# Patient Record
Sex: FEMALE | Race: WHITE | NOT HISPANIC OR LATINO | Employment: FULL TIME | URBAN - METROPOLITAN AREA
[De-identification: names, ages, dates, MRNs, and addresses within clinical notes are randomized per-mention and may not be internally consistent; named-entity substitution may affect disease eponyms.]

---

## 2021-05-13 ENCOUNTER — APPOINTMENT (OUTPATIENT)
Dept: LAB | Facility: CLINIC | Age: 31
End: 2021-05-13

## 2021-05-13 ENCOUNTER — APPOINTMENT (OUTPATIENT)
Dept: URGENT CARE | Facility: CLINIC | Age: 31
End: 2021-05-13

## 2021-05-13 DIAGNOSIS — Z02.1 PRE-EMPLOYMENT EXAMINATION: ICD-10-CM

## 2021-05-13 DIAGNOSIS — Z02.1 PRE-EMPLOYMENT EXAMINATION: Primary | ICD-10-CM

## 2021-05-13 PROCEDURE — 36415 COLL VENOUS BLD VENIPUNCTURE: CPT

## 2021-05-13 PROCEDURE — 86480 TB TEST CELL IMMUN MEASURE: CPT

## 2021-05-14 LAB
GAMMA INTERFERON BACKGROUND BLD IA-ACNC: 0.03 IU/ML
M TB IFN-G BLD-IMP: NEGATIVE
M TB IFN-G CD4+ BCKGRND COR BLD-ACNC: -0.01 IU/ML
M TB IFN-G CD4+ BCKGRND COR BLD-ACNC: 0 IU/ML
MITOGEN IGNF BCKGRD COR BLD-ACNC: >10 IU/ML

## 2021-09-08 ENCOUNTER — APPOINTMENT (OUTPATIENT)
Dept: LAB | Facility: HOSPITAL | Age: 31
End: 2021-09-08

## 2021-09-08 DIAGNOSIS — Z00.8 HEALTH EXAMINATION IN POPULATION SURVEY: ICD-10-CM

## 2021-09-08 LAB
CHOLEST SERPL-MCNC: 195 MG/DL (ref 50–200)
EST. AVERAGE GLUCOSE BLD GHB EST-MCNC: 97 MG/DL
HBA1C MFR BLD: 5 %
HDLC SERPL-MCNC: 74 MG/DL
LDLC SERPL CALC-MCNC: 108 MG/DL (ref 0–100)
NONHDLC SERPL-MCNC: 121 MG/DL
TRIGL SERPL-MCNC: 64 MG/DL

## 2021-09-08 PROCEDURE — 36415 COLL VENOUS BLD VENIPUNCTURE: CPT

## 2021-09-08 PROCEDURE — 83036 HEMOGLOBIN GLYCOSYLATED A1C: CPT

## 2021-09-08 PROCEDURE — 80061 LIPID PANEL: CPT

## 2021-09-13 ENCOUNTER — OFFICE VISIT (OUTPATIENT)
Dept: FAMILY MEDICINE CLINIC | Facility: CLINIC | Age: 31
End: 2021-09-13
Payer: COMMERCIAL

## 2021-09-13 VITALS
HEIGHT: 62 IN | BODY MASS INDEX: 24.88 KG/M2 | WEIGHT: 135.2 LBS | HEART RATE: 61 BPM | DIASTOLIC BLOOD PRESSURE: 68 MMHG | OXYGEN SATURATION: 99 % | TEMPERATURE: 99 F | SYSTOLIC BLOOD PRESSURE: 118 MMHG | RESPIRATION RATE: 18 BRPM

## 2021-09-13 DIAGNOSIS — N93.9 ABNORMAL UTERINE BLEEDING: ICD-10-CM

## 2021-09-13 DIAGNOSIS — Z00.00 ANNUAL PHYSICAL EXAM: Primary | ICD-10-CM

## 2021-09-13 DIAGNOSIS — Z12.4 SCREENING FOR CERVICAL CANCER: ICD-10-CM

## 2021-09-13 DIAGNOSIS — Z86.39 HISTORY OF IRON DEFICIENCY: ICD-10-CM

## 2021-09-13 PROCEDURE — 3008F BODY MASS INDEX DOCD: CPT | Performed by: INTERNAL MEDICINE

## 2021-09-13 PROCEDURE — 99213 OFFICE O/P EST LOW 20 MIN: CPT | Performed by: INTERNAL MEDICINE

## 2021-09-13 PROCEDURE — 3725F SCREEN DEPRESSION PERFORMED: CPT | Performed by: INTERNAL MEDICINE

## 2021-09-13 PROCEDURE — 99385 PREV VISIT NEW AGE 18-39: CPT | Performed by: INTERNAL MEDICINE

## 2021-09-13 PROCEDURE — 1036F TOBACCO NON-USER: CPT | Performed by: INTERNAL MEDICINE

## 2021-09-13 RX ORDER — FEXOFENADINE HCL 180 MG/1
180 TABLET ORAL AS NEEDED
COMMUNITY

## 2021-09-13 RX ORDER — COVID-19 ANTIGEN TEST
KIT MISCELLANEOUS AS NEEDED
COMMUNITY

## 2021-09-13 RX ORDER — VITAMIN B COMPLEX
1 CAPSULE ORAL DAILY
COMMUNITY

## 2021-09-13 NOTE — PROGRESS NOTES
[facility]    NAME: Stephenie Clancy  AGE: 36 y o  SEX: female  : 1990     DATE: 2021     Assessment and Plan:     Problem List Items Addressed This Visit        Genitourinary    Abnormal uterine bleeding    Relevant Orders    TSH, 3rd generation with Free T4 reflex       Other    History of iron deficiency      Other Visit Diagnoses     Annual physical exam    -  Primary    Screening for cervical cancer              Immunizations and preventive care screenings were discussed with patient today  Appropriate education was printed on patient's after visit summary  Counseling:  Alcohol/drug use: discussed moderation in alcohol intake, the recommendations for healthy alcohol use, and avoidance of illicit drug use  Dental Health: discussed importance of regular tooth brushing, flossing, and dental visits  Injury prevention: discussed safety/seat belts, safety helmets, smoke detectors, carbon dioxide detectors, and smoking near bedding or upholstery  Sexual health: discussed sexually transmitted diseases, partner selection, use of condoms, avoidance of unintended pregnancy, and contraceptive alternatives  · Exercise: the importance of regular exercise/physical activity was discussed  Recommend exercise 3-5 times per week for at least 30 minutes  Return in about 6 months (around 3/13/2022) for Follow up/Pap   Chief Complaint:     Chief Complaint   Patient presents with   [facility] Annual Exam     caring starts with you phys      History of Present Illness:     Adult Annual Physical   Patient here for a comprehensive physical exam  The patient reports problems - needs physical for work  Diet and Physical Activity  · Diet/Nutrition: well balanced diet  Limits processed foods, high fiber  · Exercise: walking, strength training exercises and 3-4 times a week on average        Depression Screening  PHQ-9 Depression Screening    PHQ-9:   Frequency of the following problems over the past two weeks:      Little interest or pleasure in doing things: 0 - not at all  Feeling down, depressed, or hopeless: 0 - not at all  PHQ-2 Score: 0       General Health  · Sleep: sleeps well and gets 7-8 hours of sleep on average  · Hearing: normal - bilateral   · Vision: most recent eye exam <1 year ago and wears glasses  · Dental: regular dental visits  /GYN Health  · Last menstrual period: currently menstruating  · Contraceptive method: Na   · History of STDs?: no      Review of Systems:     Review of Systems  Refer to separate note     Past Medical History:     History reviewed  No pertinent past medical history  Past Surgical History:     Past Surgical History:   Procedure Laterality Date    MOLE REMOVAL Right 2013    right dorasal hand - benign    WISDOM TOOTH EXTRACTION Bilateral       Social History:     Social History     Socioeconomic History    Marital status: Single     Spouse name: None    Number of children: None    Years of education: None    Highest education level: None   Occupational History    None   Tobacco Use    Smoking status: Never Smoker    Smokeless tobacco: Never Used   Vaping Use    Vaping Use: Never used   Substance and Sexual Activity    Alcohol use: Yes     Comment: "very seldom"    Drug use: Never    Sexual activity: Not Currently     Partners: Male   Other Topics Concern    None   Social History Narrative    None     Social Determinants of Health     Financial Resource Strain:     Difficulty of Paying Living Expenses:    Food Insecurity:     Worried About Running Out of Food in the Last Year:     Ran Out of Food in the Last Year:    Transportation Needs:     Lack of Transportation (Medical):      Lack of Transportation (Non-Medical):    Physical Activity:     Days of Exercise per Week:     Minutes of Exercise per Session:    Stress:     Feeling of Stress :    Social Connections:     Frequency of Communication with Friends and Family:     Frequency of Social Gatherings with Friends and Family:     Attends Synagogue Services:     Active Member of Clubs or Organizations:     Attends Club or Organization Meetings:     Marital Status:    Intimate Partner Violence:     Fear of Current or Ex-Partner:     Emotionally Abused:     Physically Abused:     Sexually Abused:       Family History:     Family History   Problem Relation Age of Onset    No Known Problems Mother     Mitral valve prolapse Father     No Known Problems Sister       Current Medications:     Current Outpatient Medications   Medication Sig Dispense Refill    b complex vitamins capsule Take 1 capsule by mouth daily      fexofenadine (ALLEGRA) 180 MG tablet Take 180 mg by mouth as needed      Naproxen Sodium (Aleve) 220 MG CAPS Take by mouth as needed       No current facility-administered medications for this visit  Allergies:      Allergies   Allergen Reactions    Cefaclor Hives    Cefprozil Hives    Cephalosporins Hives     Other reaction(s): hives      Physical Exam:     /68   Pulse 61   Temp 99 °F (37 2 °C)   Resp 18   Ht 5' 2" (1 575 m)   Wt 61 3 kg (135 lb 3 2 oz)   SpO2 99%   BMI 24 73 kg/m²     Physical Exam   Refer to separate note    Billy Oconnell MD   [address]

## 2021-09-13 NOTE — PROGRESS NOTES
[facility]        NAME: Iram Sandoval is a 36 y o  female  : 1990    MRN: [id number]  DATE: 2021  TIME: 3:31 PM    Assessment and Plan   1  Abnormal uterine bleeding  -history of menorrhagia and dysmenorrhea, largely resolved  Doesn't think she's had TFTs done      -   TSH, 3rd generation with Free T4 reflex; Future    2  Screening for cervical cancer  - offered to schedule pap smear at follow up in 6 months or referral to Gyn      3  Annual physical exam  - refer to annual PE note    4  History of iron deficiency  - due to menorrhagia, pt reports CBC within past year was normal             Chief Complaint     Chief Complaint   Patient presents with   [facility] [place] [facility]     caring starts with you phys         History of Present Illness       Here to establish care  Previous PCP in [facility]  Moved back home to [place] recently  No significant past medical history  AUB: reports history of heavy menses (saturating pad/tampon q2hrs, large clots) and painful menstrual cramps  This has largely resolved since making dietary changes - eating healthier, avoiding processed foods  There is history of iron deficiency but CBC within past year was normal according to pt  She denies excessive fatigue, lightheadedness or SOB  FHx: notable for MVP in father  Social hx: never smoker, drinks alcohol occasionally, no illicit drug use  Not currently sexually active  Health maintenance: last pap smear 2020, no history of abnormal pap or HPV  Review of Systems   Review of Systems   Constitutional: Negative for chills, fatigue, fever and unexpected weight change  HENT: Positive for postnasal drip and rhinorrhea  Negative for congestion, sore throat and trouble swallowing  Eyes: Negative for pain, redness, itching and visual disturbance  Respiratory: Negative for cough, chest tightness, shortness of breath and wheezing      Cardiovascular: Negative for chest pain, palpitations and leg swelling  Gastrointestinal: Negative for abdominal pain, blood in stool, constipation, diarrhea, nausea and vomiting  Endocrine: Negative for cold intolerance, heat intolerance, polydipsia and polyuria  Genitourinary: Negative for difficulty urinating, dysuria, frequency, hematuria, urgency and vaginal discharge  Musculoskeletal: Negative for arthralgias, back pain and joint swelling  Skin: Negative for rash  Neurological: Negative for dizziness, tremors, weakness, light-headedness, numbness and headaches  Psychiatric/Behavioral: Negative for confusion, dysphoric mood, hallucinations, sleep disturbance and suicidal ideas  The patient is not nervous/anxious  Current Medications       Current Outpatient Medications:     b complex vitamins capsule, Take 1 capsule by mouth daily, Disp: , Rfl:     fexofenadine (ALLEGRA) 180 MG tablet, Take 180 mg by mouth as needed, Disp: , Rfl:     Naproxen Sodium (Aleve) 220 MG CAPS, Take by mouth as needed, Disp: , Rfl:     Current Allergies     Allergies as of 09/13/2021 - Reviewed 09/13/2021   Allergen Reaction Noted    Cefaclor Hives 07/17/2015    Cefprozil Hives 07/17/2015    Cephalosporins Hives 07/17/2015            The following portions of the patient's history were reviewed and updated as appropriate: allergies, current medications, past family history, past medical history, past social history, past surgical history and problem list      History reviewed  No pertinent past medical history  Past Surgical History:   Procedure Laterality Date    MOLE REMOVAL Right 2013    right dorasal hand - benign    WISDOM TOOTH EXTRACTION Bilateral        Family History   Problem Relation Age of Onset    No Known Problems Mother     Mitral valve prolapse Father     No Known Problems Sister          Medications have been verified          Objective   /68   Pulse 61   Temp 99 °F (37 2 °C)   Resp 18   Ht 5' 2" (1 575 m)   Wt 61 3 kg (135 lb 3 2 oz)   SpO2 99%   BMI 24 73 kg/m²        Physical Exam     Physical Exam  Constitutional:       General: She is not in acute distress  Appearance: She is well-developed and normal weight  HENT:      Head: Normocephalic and atraumatic  Right Ear: Tympanic membrane, ear canal and external ear normal       Left Ear: Tympanic membrane, ear canal and external ear normal       Mouth/Throat:      Pharynx: No oropharyngeal exudate or posterior oropharyngeal erythema  Eyes:      General: No scleral icterus  Conjunctiva/sclera: Conjunctivae normal    Neck:      Thyroid: No thyromegaly  Vascular: No JVD  Cardiovascular:      Rate and Rhythm: Normal rate and regular rhythm  Pulses: Normal pulses  Heart sounds: Normal heart sounds  No murmur heard  No friction rub  No gallop  Pulmonary:      Effort: Pulmonary effort is normal  No respiratory distress  Breath sounds: Normal breath sounds  No wheezing, rhonchi or rales  Chest:      Chest wall: No tenderness  Abdominal:      General: Abdomen is flat  Bowel sounds are normal  There is no distension  Palpations: Abdomen is soft  There is no mass  Tenderness: There is no abdominal tenderness  There is no guarding or rebound  Hernia: No hernia is present  Musculoskeletal:         General: No deformity  Normal range of motion  Cervical back: Normal range of motion and neck supple  Right lower leg: No edema  Left lower leg: No edema  Lymphadenopathy:      Cervical: No cervical adenopathy  Skin:     General: Skin is warm and dry  Capillary Refill: Capillary refill takes less than 2 seconds  Neurological:      General: No focal deficit present  Mental Status: She is alert and oriented to person, place, and time     Psychiatric:         Mood and Affect: Mood and affect normal          Speech: Speech normal          Behavior: Behavior normal  Behavior is cooperative  Thought Content:  Thought content normal          Judgment: Judgment normal              Results:  No results found for: SODIUM, K, CL, CO2, BUN, CREATININE, GLUC, CALCIUM    Lab Results   Component Value Date    HGBA1C 5 0 09/08/2021     Lab Results   Component Value Date    CHOLESTEROL 195 09/08/2021     Lab Results   Component Value Date    HDL 74 09/08/2021     Lab Results   Component Value Date    TRIG 64 09/08/2021     Lab Results   Component Value Date    [facility] 121 09/08/2021

## 2021-09-13 NOTE — PATIENT INSTRUCTIONS

## 2021-09-21 ENCOUNTER — APPOINTMENT (OUTPATIENT)
Dept: LAB | Facility: CLINIC | Age: 31
End: 2021-09-21
Payer: COMMERCIAL

## 2021-09-21 DIAGNOSIS — N93.9 ABNORMAL UTERINE BLEEDING: ICD-10-CM

## 2021-09-21 LAB — TSH SERPL DL<=0.05 MIU/L-ACNC: 1.17 UIU/ML (ref 0.36–3.74)

## 2021-09-21 PROCEDURE — 36415 COLL VENOUS BLD VENIPUNCTURE: CPT

## 2021-09-21 PROCEDURE — 84443 ASSAY THYROID STIM HORMONE: CPT

## 2022-01-27 ENCOUNTER — OFFICE VISIT (OUTPATIENT)
Dept: URGENT CARE | Facility: CLINIC | Age: 32
End: 2022-01-27
Payer: COMMERCIAL

## 2022-01-27 VITALS
HEIGHT: 62 IN | WEIGHT: 130 LBS | TEMPERATURE: 98.5 F | OXYGEN SATURATION: 100 % | RESPIRATION RATE: 18 BRPM | BODY MASS INDEX: 23.92 KG/M2 | HEART RATE: 104 BPM | SYSTOLIC BLOOD PRESSURE: 112 MMHG | DIASTOLIC BLOOD PRESSURE: 70 MMHG

## 2022-01-27 DIAGNOSIS — R21 RASH: Primary | ICD-10-CM

## 2022-01-27 PROCEDURE — 99213 OFFICE O/P EST LOW 20 MIN: CPT

## 2022-01-27 RX ORDER — DOXYCYCLINE 100 MG/1
100 TABLET ORAL 2 TIMES DAILY
Qty: 28 TABLET | Refills: 0 | Status: SHIPPED | OUTPATIENT
Start: 2022-01-27 | End: 2022-02-10

## 2022-01-27 NOTE — PROGRESS NOTES
[facility]        NAME: Jossy Benavidez is a 36 y o  female  : 1990    MRN: [id number]  DATE: 2022  TIME: 8:28 AM    Assessment and Plan   Rash [R21]  1  Rash  doxycycline (ADOXA) 100 MG tablet       Patient concerned for Lyme  Low suspicion, however, she would still like course of Doxy  Also suggested to the patient to f/u with PCP for blood work if no treatment initiated and to monitor for new or spreading rash and for any new symptoms  Can apply vaseline or hydrocortisone cream to rash  Patient Instructions     Patient Instructions     Lyme Disease   WHAT YOU NEED TO KNOW:   Lyme disease is a bacterial infection caused by the bite of an infected tick  DISCHARGE INSTRUCTIONS:   Call your local emergency number (911 in the [address] [place] [address],[address]) if:   · Your heart is beating faster than usual and you feel dizzy  · You have chest pain or trouble breathing  · You suddenly cannot talk or see well, or you have trouble moving an area of your body  Return to the emergency department if:   · You have a headache and a stiff neck  · You have trouble concentrating or thinking clearly  · You have numbness or tingling in your arms or legs, or you have trouble walking  Call your doctor if:   · Your rash grows or spreads to other areas of your body  · You suddenly have trouble falling or staying asleep  · You have new or worsening pain and swelling in your joints  · You have new or worsening weakness and muscle pain  · You have a new tick bite  · You have questions or concerns about your condition or care  Medicines: You may need any of the following:  · Antibiotics  treat a bacterial infection  · Take your medicine as directed  Contact your healthcare provider if you think your medicine is not helping or if you have side effects  Tell him of her if you are allergic to any medicine  Keep a list of the medicines, vitamins, and herbs you take   Include the amounts, and when and why you take them  Bring the list or the pill bottles to follow-up visits  Carry your medicine list with you in case of an emergency  Prevent a tick bite:  Ticks live in areas covered by brush and grass  They may even be found in your lawn if you live in certain areas  Outdoor pets can carry ticks inside the house  Ticks can grab onto you or your clothes when you walk by grass or brush  If you go into areas that contain many trees, tall grasses, and underbrush, do the following:     · Wear light colored pants and a long-sleeved shirt  Tuck your pants into your socks or boots  Tuck in your shirt  Wear sleeves that fit close to the skin at your wrists and neck  This will help prevent ticks from crawling through gaps in your clothing and onto your skin  Wear a hat in areas with trees  · Apply insect repellant on your skin  The insect repellant should contain DEET  Do not put insect repellant on skin that is cut, scratched, or irritated  Always use soap and water to wash the insect repellant off as soon as possible once you are indoors  Do not apply insect repellant on your child's face or hands  · Spray insect repellant onto your clothes  Use permethrin spray  This spray kills ticks that crawl on your clothing  Be sure to spray the tops of your boots, bottom of pant legs, and sleeve cuffs  As soon as possible, wash and dry clothing in hot water and high heat  · Check your and your child's clothing, hair, and skin for ticks  Shower within 2 hours of coming indoors  Carefully check the hairline, armpits, neck, and waist     · Decrease the risk for ticks in your yard  Ticks like to live in shady, moist areas  [place] your lawn regularly to keep the grass short  Trim the grass around birdbaths and fences  Cut branches that are overgrown and take them out of the yard  Clear out leaf piles  Teola Bitter firewood in a dry, oral area  · Treat pets with tick control products  as directed   This will decrease your risk for a tick bite  Check your pets for ticks  Remove ticks from pets the same way as you remove them from people  Ask your pet's  about the best product to use on your pet  · Remove a tick with tweezers  Wear gloves  Grasp the tick as close to your skin as possible  Pull the tick straight up and out  Do not touch the tick with your bare hands  Check to make sure you removed the whole tick, including the head  Clean the area with soap and water or rubbing alcohol  Then wash your hands with soap and water  Follow up with your doctor as directed:  Write down your questions so you remember to ask them during your visits  © Copyright [employer] 2021 Information is for End User's use only and may not be sold, redistributed or otherwise used for commercial purposes  All illustrations and images included in CareNotes® are the copyrighted property of A [employer] M [employer] Inc  or [employer]   The above information is an  only  It is not intended as medical advice for individual conditions or treatments  Talk to your doctor, nurse or pharmacist before following any medical regimen to see if it is safe and effective for you  Follow up with PCP in 3-5 days  Proceed to  ER if symptoms worsen  Chief Complaint     Chief Complaint   Patient presents with    Rash     Patient noticed rash/lesion to left upper thigh this morning  History of Present Illness       HPI   Pritesh Kimbrough is a 36 y o  female who presents today with a rash on the left upper inner thigh that she noticed this morning  The rash is circular, light pink in color with a dry area in the center  Denies itching, burning, or pain  Denies history of eczema or lyme  Denies new soaps, detergents or lotion  Did not apply anything to the rash so far  Patient reports she sleeps with her dog and is concerned for Lyme  No known recent tick bite or ticks seen on her dog       Review of Systems   Review of Systems   Constitutional: Negative for chills, fatigue and fever  Eyes: Negative for pain and visual disturbance  Respiratory: Negative for cough and shortness of breath  Cardiovascular: Negative for chest pain and palpitations  Gastrointestinal: Negative for abdominal pain, diarrhea, nausea and vomiting  Musculoskeletal: Negative for arthralgias, back pain, joint swelling and myalgias  Skin: Positive for rash  Negative for color change  Neurological: Negative for dizziness and headaches  All other systems reviewed and are negative  Current Medications       Current Outpatient Medications:     b complex vitamins capsule, Take 1 capsule by mouth daily (Patient not taking: Reported on 1/27/2022 ), Disp: , Rfl:     doxycycline (ADOXA) 100 MG tablet, Take 1 tablet (100 mg total) by mouth 2 (two) times a day for 14 days, Disp: 28 tablet, Rfl: 0    fexofenadine (ALLEGRA) 180 MG tablet, Take 180 mg by mouth as needed (Patient not taking: Reported on 1/27/2022 ), Disp: , Rfl:     Naproxen Sodium (Aleve) 220 MG CAPS, Take by mouth as needed (Patient not taking: Reported on 1/27/2022 ), Disp: , Rfl:     Current Allergies     Allergies as of 01/27/2022 - Reviewed 01/27/2022   Allergen Reaction Noted    Cefaclor Hives 07/17/2015    Cefprozil Hives 07/17/2015    Cephalosporins Hives 07/17/2015            The following portions of the patient's history were reviewed and updated as appropriate: allergies, current medications, past family history, past medical history, past social history, past surgical history and problem list      History reviewed  No pertinent past medical history      Past Surgical History:   Procedure Laterality Date    MOLE REMOVAL Right 2013    right dorasal hand - benign    WISDOM TOOTH EXTRACTION Bilateral        Family History   Problem Relation Age of Onset    No Known Problems Mother     Mitral valve prolapse Father     No Known Problems Sister          Medications have been verified  Objective   /65   Pulse 104   Temp 98 7 °F (37 1 °C) (Temporal)   Resp 18   Ht 5' 2" (1 575 m)   Wt 59 kg (130 lb)   SpO2 100%   BMI 23 78 kg/m²        Physical Exam     Physical Exam  Vitals and nursing note reviewed  Constitutional:       General: She is not in acute distress  Appearance: Normal appearance  HENT:      Head: Normocephalic and atraumatic  Cardiovascular:      Rate and Rhythm: Normal rate and regular rhythm  Heart sounds: Normal heart sounds  Pulmonary:      Effort: Pulmonary effort is normal       Breath sounds: Normal breath sounds  No wheezing, rhonchi or rales  Lymphadenopathy:      Cervical: No cervical adenopathy  Skin:     General: Skin is warm and dry  Findings: Rash present  Psychiatric:         Behavior: Behavior normal  Behavior is cooperative

## 2022-01-27 NOTE — PATIENT INSTRUCTIONS
Lyme Disease   WHAT YOU NEED TO KNOW:   Lyme disease is a bacterial infection caused by the bite of an infected tick  DISCHARGE INSTRUCTIONS:   Call your local emergency number (911 in the [address] [facility] [address]) if:   · Your heart is beating faster than usual and you feel dizzy  · You have chest pain or trouble breathing  · You suddenly cannot talk or see well, or you have trouble moving an area of your body  Return to the emergency department if:   · You have a headache and a stiff neck  · You have trouble concentrating or thinking clearly  · You have numbness or tingling in your arms or legs, or you have trouble walking  Call your doctor if:   · Your rash grows or spreads to other areas of your body  · You suddenly have trouble falling or staying asleep  · You have new or worsening pain and swelling in your joints  · You have new or worsening weakness and muscle pain  · You have a new tick bite  · You have questions or concerns about your condition or care  Medicines: You may need any of the following:  · Antibiotics  treat a bacterial infection  · Take your medicine as directed  Contact your healthcare provider if you think your medicine is not helping or if you have side effects  Tell him of her if you are allergic to any medicine  Keep a list of the medicines, vitamins, and herbs you take  Include the amounts, and when and why you take them  Bring the list or the pill bottles to follow-up visits  Carry your medicine list with you in case of an emergency  Prevent a tick bite:  Ticks live in areas covered by brush and grass  They may even be found in your lawn if you live in certain areas  Outdoor pets can carry ticks inside the house  Ticks can grab onto you or your clothes when you walk by grass or brush  If you go into areas that contain many trees, tall grasses, and underbrush, do the following:     · Wear light colored pants and a long-sleeved shirt    Tuck your pants into your socks or boots  Tuck in your shirt  Wear sleeves that fit close to the skin at your wrists and neck  This will help prevent ticks from crawling through gaps in your clothing and onto your skin  Wear a hat in areas with trees  · Apply insect repellant on your skin  The insect repellant should contain DEET  Do not put insect repellant on skin that is cut, scratched, or irritated  Always use soap and water to wash the insect repellant off as soon as possible once you are indoors  Do not apply insect repellant on your child's face or hands  · Spray insect repellant onto your clothes  Use permethrin spray  This spray kills ticks that crawl on your clothing  Be sure to spray the tops of your boots, bottom of pant legs, and sleeve cuffs  As soon as possible, wash and dry clothing in hot water and high heat  · Check your and your child's clothing, hair, and skin for ticks  Shower within 2 hours of coming indoors  Carefully check the hairline, armpits, neck, and waist     · Decrease the risk for ticks in your yard  Ticks like to live in shady, moist areas  Stefanie Bosworth your lawn regularly to keep the grass short  Trim the grass around birdbaths and fences  Cut branches that are overgrown and take them out of the yard  Clear out leaf piles  Gradie Mems firewood in a dry, oral area  · Treat pets with tick control products  as directed  This will decrease your risk for a tick bite  Check your pets for ticks  Remove ticks from pets the same way as you remove them from people  Ask your pet's  about the best product to use on your pet  · Remove a tick with tweezers  Wear gloves  Grasp the tick as close to your skin as possible  Pull the tick straight up and out  Do not touch the tick with your bare hands  Check to make sure you removed the whole tick, including the head  Clean the area with soap and water or rubbing alcohol  Then wash your hands with soap and water         Follow up with your doctor as directed: Write down your questions so you remember to ask them during your visits  © Copyright [employer] 2021 Information is for End User's use only and may not be sold, redistributed or otherwise used for commercial purposes  All illustrations and images included in CareNotes® are the copyrighted property of A D A M , Inc  or Gracie Gaspar  The above information is an  only  It is not intended as medical advice for individual conditions or treatments  Talk to your doctor, nurse or pharmacist before following any medical regimen to see if it is safe and effective for you  good balance

## 2022-02-01 ENCOUNTER — LAB REQUISITION (OUTPATIENT)
Dept: LAB | Facility: HOSPITAL | Age: 32
End: 2022-02-01

## 2022-02-01 DIAGNOSIS — Z11.52 ENCOUNTER FOR SCREENING FOR COVID-19: ICD-10-CM

## 2022-02-01 PROCEDURE — U0005 INFEC AGEN DETEC AMPLI PROBE: HCPCS

## 2022-02-01 PROCEDURE — U0003 INFECTIOUS AGENT DETECTION BY NUCLEIC ACID (DNA OR RNA); SEVERE ACUTE RESPIRATORY SYNDROME CORONAVIRUS 2 (SARS-COV-2) (CORONAVIRUS DISEASE [COVID-19]), AMPLIFIED PROBE TECHNIQUE, MAKING USE OF HIGH THROUGHPUT TECHNOLOGIES AS DESCRIBED BY CMS-2020-01-R: HCPCS

## 2022-02-02 LAB — SARS-COV-2 RNA RESP QL NAA+PROBE: NEGATIVE

## 2022-02-15 ENCOUNTER — LAB REQUISITION (OUTPATIENT)
Dept: LAB | Facility: HOSPITAL | Age: 32
End: 2022-02-15

## 2022-02-15 DIAGNOSIS — Z11.52 ENCOUNTER FOR SCREENING FOR COVID-19: ICD-10-CM

## 2022-02-15 LAB — SARS-COV-2 RNA RESP QL NAA+PROBE: NEGATIVE

## 2022-02-15 PROCEDURE — U0003 INFECTIOUS AGENT DETECTION BY NUCLEIC ACID (DNA OR RNA); SEVERE ACUTE RESPIRATORY SYNDROME CORONAVIRUS 2 (SARS-COV-2) (CORONAVIRUS DISEASE [COVID-19]), AMPLIFIED PROBE TECHNIQUE, MAKING USE OF HIGH THROUGHPUT TECHNOLOGIES AS DESCRIBED BY CMS-2020-01-R: HCPCS

## 2022-02-15 PROCEDURE — U0005 INFEC AGEN DETEC AMPLI PROBE: HCPCS

## 2022-02-21 ENCOUNTER — LAB REQUISITION (OUTPATIENT)
Dept: LAB | Facility: HOSPITAL | Age: 32
End: 2022-02-21

## 2022-02-21 DIAGNOSIS — Z11.52 ENCOUNTER FOR SCREENING FOR COVID-19: ICD-10-CM

## 2022-02-21 PROCEDURE — U0003 INFECTIOUS AGENT DETECTION BY NUCLEIC ACID (DNA OR RNA); SEVERE ACUTE RESPIRATORY SYNDROME CORONAVIRUS 2 (SARS-COV-2) (CORONAVIRUS DISEASE [COVID-19]), AMPLIFIED PROBE TECHNIQUE, MAKING USE OF HIGH THROUGHPUT TECHNOLOGIES AS DESCRIBED BY CMS-2020-01-R: HCPCS

## 2022-02-21 PROCEDURE — U0005 INFEC AGEN DETEC AMPLI PROBE: HCPCS

## 2022-02-22 LAB — SARS-COV-2 RNA RESP QL NAA+PROBE: NEGATIVE

## 2022-03-15 ENCOUNTER — LAB REQUISITION (OUTPATIENT)
Dept: LAB | Facility: HOSPITAL | Age: 32
End: 2022-03-15

## 2022-03-15 DIAGNOSIS — Z11.52 ENCOUNTER FOR SCREENING FOR COVID-19: ICD-10-CM

## 2022-03-15 PROCEDURE — U0005 INFEC AGEN DETEC AMPLI PROBE: HCPCS

## 2022-03-15 PROCEDURE — U0003 INFECTIOUS AGENT DETECTION BY NUCLEIC ACID (DNA OR RNA); SEVERE ACUTE RESPIRATORY SYNDROME CORONAVIRUS 2 (SARS-COV-2) (CORONAVIRUS DISEASE [COVID-19]), AMPLIFIED PROBE TECHNIQUE, MAKING USE OF HIGH THROUGHPUT TECHNOLOGIES AS DESCRIBED BY CMS-2020-01-R: HCPCS

## 2022-03-16 LAB — SARS-COV-2 RNA RESP QL NAA+PROBE: NEGATIVE

## 2022-03-21 ENCOUNTER — LAB REQUISITION (OUTPATIENT)
Dept: LAB | Facility: HOSPITAL | Age: 32
End: 2022-03-21

## 2022-03-21 DIAGNOSIS — Z11.52 ENCOUNTER FOR SCREENING FOR COVID-19: ICD-10-CM

## 2022-03-21 PROCEDURE — U0005 INFEC AGEN DETEC AMPLI PROBE: HCPCS

## 2022-03-21 PROCEDURE — U0003 INFECTIOUS AGENT DETECTION BY NUCLEIC ACID (DNA OR RNA); SEVERE ACUTE RESPIRATORY SYNDROME CORONAVIRUS 2 (SARS-COV-2) (CORONAVIRUS DISEASE [COVID-19]), AMPLIFIED PROBE TECHNIQUE, MAKING USE OF HIGH THROUGHPUT TECHNOLOGIES AS DESCRIBED BY CMS-2020-01-R: HCPCS

## 2022-03-22 LAB — SARS-COV-2 RNA RESP QL NAA+PROBE: NEGATIVE

## 2022-03-28 ENCOUNTER — LAB REQUISITION (OUTPATIENT)
Dept: LAB | Facility: HOSPITAL | Age: 32
End: 2022-03-28

## 2022-03-28 DIAGNOSIS — Z11.52 ENCOUNTER FOR SCREENING FOR COVID-19: ICD-10-CM

## 2022-03-28 PROCEDURE — U0003 INFECTIOUS AGENT DETECTION BY NUCLEIC ACID (DNA OR RNA); SEVERE ACUTE RESPIRATORY SYNDROME CORONAVIRUS 2 (SARS-COV-2) (CORONAVIRUS DISEASE [COVID-19]), AMPLIFIED PROBE TECHNIQUE, MAKING USE OF HIGH THROUGHPUT TECHNOLOGIES AS DESCRIBED BY CMS-2020-01-R: HCPCS

## 2022-03-28 PROCEDURE — U0005 INFEC AGEN DETEC AMPLI PROBE: HCPCS

## 2022-03-29 LAB — SARS-COV-2 RNA RESP QL NAA+PROBE: NEGATIVE

## 2022-04-04 ENCOUNTER — LAB REQUISITION (OUTPATIENT)
Dept: LAB | Facility: HOSPITAL | Age: 32
End: 2022-04-04

## 2022-04-04 DIAGNOSIS — Z11.52 ENCOUNTER FOR SCREENING FOR COVID-19: ICD-10-CM

## 2022-04-04 PROCEDURE — U0003 INFECTIOUS AGENT DETECTION BY NUCLEIC ACID (DNA OR RNA); SEVERE ACUTE RESPIRATORY SYNDROME CORONAVIRUS 2 (SARS-COV-2) (CORONAVIRUS DISEASE [COVID-19]), AMPLIFIED PROBE TECHNIQUE, MAKING USE OF HIGH THROUGHPUT TECHNOLOGIES AS DESCRIBED BY CMS-2020-01-R: HCPCS

## 2022-04-04 PROCEDURE — U0005 INFEC AGEN DETEC AMPLI PROBE: HCPCS

## 2022-04-05 LAB — SARS-COV-2 RNA RESP QL NAA+PROBE: NEGATIVE

## 2022-04-12 ENCOUNTER — LAB REQUISITION (OUTPATIENT)
Dept: LAB | Facility: HOSPITAL | Age: 32
End: 2022-04-12

## 2022-04-12 DIAGNOSIS — Z11.52 ENCOUNTER FOR SCREENING FOR COVID-19: ICD-10-CM

## 2022-04-12 LAB — SARS-COV-2 RNA RESP QL NAA+PROBE: NEGATIVE

## 2022-04-12 PROCEDURE — U0005 INFEC AGEN DETEC AMPLI PROBE: HCPCS

## 2022-04-12 PROCEDURE — U0003 INFECTIOUS AGENT DETECTION BY NUCLEIC ACID (DNA OR RNA); SEVERE ACUTE RESPIRATORY SYNDROME CORONAVIRUS 2 (SARS-COV-2) (CORONAVIRUS DISEASE [COVID-19]), AMPLIFIED PROBE TECHNIQUE, MAKING USE OF HIGH THROUGHPUT TECHNOLOGIES AS DESCRIBED BY CMS-2020-01-R: HCPCS

## 2022-04-19 ENCOUNTER — LAB REQUISITION (OUTPATIENT)
Dept: LAB | Facility: HOSPITAL | Age: 32
End: 2022-04-19

## 2022-04-19 DIAGNOSIS — Z11.52 ENCOUNTER FOR SCREENING FOR COVID-19: ICD-10-CM

## 2022-04-19 PROCEDURE — U0003 INFECTIOUS AGENT DETECTION BY NUCLEIC ACID (DNA OR RNA); SEVERE ACUTE RESPIRATORY SYNDROME CORONAVIRUS 2 (SARS-COV-2) (CORONAVIRUS DISEASE [COVID-19]), AMPLIFIED PROBE TECHNIQUE, MAKING USE OF HIGH THROUGHPUT TECHNOLOGIES AS DESCRIBED BY CMS-2020-01-R: HCPCS

## 2022-04-19 PROCEDURE — U0005 INFEC AGEN DETEC AMPLI PROBE: HCPCS

## 2022-04-20 LAB — SARS-COV-2 RNA RESP QL NAA+PROBE: NEGATIVE

## 2022-04-26 ENCOUNTER — LAB REQUISITION (OUTPATIENT)
Dept: LAB | Facility: HOSPITAL | Age: 32
End: 2022-04-26

## 2022-04-26 DIAGNOSIS — Z11.52 ENCOUNTER FOR SCREENING FOR COVID-19: ICD-10-CM

## 2022-04-26 PROCEDURE — U0005 INFEC AGEN DETEC AMPLI PROBE: HCPCS

## 2022-04-26 PROCEDURE — U0003 INFECTIOUS AGENT DETECTION BY NUCLEIC ACID (DNA OR RNA); SEVERE ACUTE RESPIRATORY SYNDROME CORONAVIRUS 2 (SARS-COV-2) (CORONAVIRUS DISEASE [COVID-19]), AMPLIFIED PROBE TECHNIQUE, MAKING USE OF HIGH THROUGHPUT TECHNOLOGIES AS DESCRIBED BY CMS-2020-01-R: HCPCS

## 2022-04-27 LAB — SARS-COV-2 RNA RESP QL NAA+PROBE: NEGATIVE

## 2022-05-17 ENCOUNTER — LAB REQUISITION (OUTPATIENT)
Dept: LAB | Facility: HOSPITAL | Age: 32
End: 2022-05-17

## 2022-05-17 DIAGNOSIS — Z11.52 ENCOUNTER FOR SCREENING FOR COVID-19: ICD-10-CM

## 2022-05-17 PROCEDURE — U0005 INFEC AGEN DETEC AMPLI PROBE: HCPCS

## 2022-05-17 PROCEDURE — U0003 INFECTIOUS AGENT DETECTION BY NUCLEIC ACID (DNA OR RNA); SEVERE ACUTE RESPIRATORY SYNDROME CORONAVIRUS 2 (SARS-COV-2) (CORONAVIRUS DISEASE [COVID-19]), AMPLIFIED PROBE TECHNIQUE, MAKING USE OF HIGH THROUGHPUT TECHNOLOGIES AS DESCRIBED BY CMS-2020-01-R: HCPCS

## 2022-05-18 LAB — SARS-COV-2 RNA RESP QL NAA+PROBE: NEGATIVE

## 2022-05-23 ENCOUNTER — LAB REQUISITION (OUTPATIENT)
Dept: LAB | Facility: HOSPITAL | Age: 32
End: 2022-05-23

## 2022-05-23 DIAGNOSIS — Z11.52 ENCOUNTER FOR SCREENING FOR COVID-19: ICD-10-CM

## 2022-05-23 PROCEDURE — U0005 INFEC AGEN DETEC AMPLI PROBE: HCPCS

## 2022-05-23 PROCEDURE — U0003 INFECTIOUS AGENT DETECTION BY NUCLEIC ACID (DNA OR RNA); SEVERE ACUTE RESPIRATORY SYNDROME CORONAVIRUS 2 (SARS-COV-2) (CORONAVIRUS DISEASE [COVID-19]), AMPLIFIED PROBE TECHNIQUE, MAKING USE OF HIGH THROUGHPUT TECHNOLOGIES AS DESCRIBED BY CMS-2020-01-R: HCPCS

## 2022-05-24 LAB — SARS-COV-2 RNA RESP QL NAA+PROBE: NEGATIVE

## 2022-05-31 ENCOUNTER — LAB REQUISITION (OUTPATIENT)
Dept: LAB | Facility: HOSPITAL | Age: 32
End: 2022-05-31

## 2022-05-31 DIAGNOSIS — Z11.52 ENCOUNTER FOR SCREENING FOR COVID-19: ICD-10-CM

## 2022-05-31 PROCEDURE — U0005 INFEC AGEN DETEC AMPLI PROBE: HCPCS

## 2022-05-31 PROCEDURE — U0003 INFECTIOUS AGENT DETECTION BY NUCLEIC ACID (DNA OR RNA); SEVERE ACUTE RESPIRATORY SYNDROME CORONAVIRUS 2 (SARS-COV-2) (CORONAVIRUS DISEASE [COVID-19]), AMPLIFIED PROBE TECHNIQUE, MAKING USE OF HIGH THROUGHPUT TECHNOLOGIES AS DESCRIBED BY CMS-2020-01-R: HCPCS

## 2022-06-01 LAB — SARS-COV-2 RNA RESP QL NAA+PROBE: NEGATIVE

## 2022-07-29 ENCOUNTER — TELEPHONE (OUTPATIENT)
Dept: ADMINISTRATIVE | Facility: OTHER | Age: 32
End: 2022-07-29

## 2022-07-29 NOTE — TELEPHONE ENCOUNTER
----- Message from [employer] sent at 7/28/2022  2:29 PM EDT -----  Regarding: Pap  07/28/22 2:29 PM    Hello, our patient Marcy Covington has had Pap Smear (HPV) aka Cervical Cancer Screening completed/performed  Please assist in updating the patient chart by pulling the Care Everywhere (CE) document  The date of service is 3/1/22       Thank you,  Kareen Sandhu

## 2022-07-29 NOTE — TELEPHONE ENCOUNTER
Upon review of the In Basket request we were able to locate, review, and update the patient chart as requested for Pap Smear (HPV) aka Cervical Cancer Screening  Any additional questions or concerns should be emailed to the Practice Liaisons via [email or web address] email, please do not reply via In Basket      Thank you  April Solis

## 2022-08-01 ENCOUNTER — APPOINTMENT (OUTPATIENT)
Dept: LAB | Facility: CLINIC | Age: 32
End: 2022-08-01
Payer: COMMERCIAL

## 2022-08-01 ENCOUNTER — OFFICE VISIT (OUTPATIENT)
Dept: FAMILY MEDICINE CLINIC | Facility: CLINIC | Age: 32
End: 2022-08-01
Payer: COMMERCIAL

## 2022-08-01 VITALS
RESPIRATION RATE: 20 BRPM | OXYGEN SATURATION: 98 % | BODY MASS INDEX: 24.66 KG/M2 | TEMPERATURE: 98 F | DIASTOLIC BLOOD PRESSURE: 68 MMHG | HEIGHT: 62 IN | WEIGHT: 134 LBS | SYSTOLIC BLOOD PRESSURE: 104 MMHG | HEART RATE: 78 BPM

## 2022-08-01 DIAGNOSIS — Z13.220 SCREENING CHOLESTEROL LEVEL: ICD-10-CM

## 2022-08-01 DIAGNOSIS — Z13.1 SCREENING FOR DIABETES MELLITUS: ICD-10-CM

## 2022-08-01 DIAGNOSIS — Z00.00 ANNUAL PHYSICAL EXAM: ICD-10-CM

## 2022-08-01 DIAGNOSIS — Z00.00 ANNUAL PHYSICAL EXAM: Primary | ICD-10-CM

## 2022-08-01 LAB
ALBUMIN SERPL BCP-MCNC: 3.9 G/DL (ref 3.5–5)
ALP SERPL-CCNC: 42 U/L (ref 46–116)
ALT SERPL W P-5'-P-CCNC: 21 U/L (ref 12–78)
ANION GAP SERPL CALCULATED.3IONS-SCNC: 3 MMOL/L (ref 4–13)
AST SERPL W P-5'-P-CCNC: 14 U/L (ref 5–45)
BASOPHILS # BLD AUTO: 0.02 THOUSANDS/ΜL (ref 0–0.1)
BASOPHILS NFR BLD AUTO: 0 % (ref 0–1)
BILIRUB SERPL-MCNC: 0.49 MG/DL (ref 0.2–1)
BUN SERPL-MCNC: 17 MG/DL (ref 5–25)
CALCIUM SERPL-MCNC: 9.3 MG/DL (ref 8.3–10.1)
CHLORIDE SERPL-SCNC: 105 MMOL/L (ref 96–108)
CHOLEST SERPL-MCNC: 196 MG/DL
CO2 SERPL-SCNC: 28 MMOL/L (ref 21–32)
CREAT SERPL-MCNC: 0.86 MG/DL (ref 0.6–1.3)
EOSINOPHIL # BLD AUTO: 0.2 THOUSAND/ΜL (ref 0–0.61)
EOSINOPHIL NFR BLD AUTO: 4 % (ref 0–6)
ERYTHROCYTE [DISTWIDTH] IN BLOOD BY AUTOMATED COUNT: 12.3 % (ref 11.6–15.1)
EST. AVERAGE GLUCOSE BLD GHB EST-MCNC: 103 MG/DL
GFR SERPL CREATININE-BSD FRML MDRD: 90 ML/MIN/1.73SQ M
GLUCOSE P FAST SERPL-MCNC: 88 MG/DL (ref 65–99)
HBA1C MFR BLD: 5.2 %
HCT VFR BLD AUTO: 38.3 % (ref 34.8–46.1)
HDLC SERPL-MCNC: 67 MG/DL
HGB BLD-MCNC: 12.5 G/DL (ref 11.5–15.4)
IMM GRANULOCYTES # BLD AUTO: 0.01 THOUSAND/UL (ref 0–0.2)
IMM GRANULOCYTES NFR BLD AUTO: 0 % (ref 0–2)
LDLC SERPL CALC-MCNC: 116 MG/DL (ref 0–100)
LYMPHOCYTES # BLD AUTO: 1.78 THOUSANDS/ΜL (ref 0.6–4.47)
LYMPHOCYTES NFR BLD AUTO: 34 % (ref 14–44)
MCH RBC QN AUTO: 30.9 PG (ref 26.8–34.3)
MCHC RBC AUTO-ENTMCNC: 32.6 G/DL (ref 31.4–37.4)
MCV RBC AUTO: 95 FL (ref 82–98)
MONOCYTES # BLD AUTO: 0.47 THOUSAND/ΜL (ref 0.17–1.22)
MONOCYTES NFR BLD AUTO: 9 % (ref 4–12)
NEUTROPHILS # BLD AUTO: 2.72 THOUSANDS/ΜL (ref 1.85–7.62)
NEUTS SEG NFR BLD AUTO: 53 % (ref 43–75)
NONHDLC SERPL-MCNC: 129 MG/DL
NRBC BLD AUTO-RTO: 0 /100 WBCS
PLATELET # BLD AUTO: 196 THOUSANDS/UL (ref 149–390)
PMV BLD AUTO: 11.8 FL (ref 8.9–12.7)
POTASSIUM SERPL-SCNC: 4 MMOL/L (ref 3.5–5.3)
PROT SERPL-MCNC: 7.8 G/DL (ref 6.4–8.4)
RBC # BLD AUTO: 4.05 MILLION/UL (ref 3.81–5.12)
SODIUM SERPL-SCNC: 136 MMOL/L (ref 135–147)
TRIGL SERPL-MCNC: 65 MG/DL
WBC # BLD AUTO: 5.2 THOUSAND/UL (ref 4.31–10.16)

## 2022-08-01 PROCEDURE — 36415 COLL VENOUS BLD VENIPUNCTURE: CPT

## 2022-08-01 PROCEDURE — 83036 HEMOGLOBIN GLYCOSYLATED A1C: CPT

## 2022-08-01 PROCEDURE — 80061 LIPID PANEL: CPT

## 2022-08-01 PROCEDURE — 3725F SCREEN DEPRESSION PERFORMED: CPT | Performed by: NURSE PRACTITIONER

## 2022-08-01 PROCEDURE — 80053 COMPREHEN METABOLIC PANEL: CPT

## 2022-08-01 PROCEDURE — 99395 PREV VISIT EST AGE 18-39: CPT | Performed by: NURSE PRACTITIONER

## 2022-08-01 PROCEDURE — 85025 COMPLETE CBC W/AUTO DIFF WBC: CPT

## 2022-08-01 NOTE — PROGRESS NOTES
HPI:  Ava aPtel is a 29 y o  female here for her yearly health maintenance exam    Patient Active Problem List   Diagnosis    Abnormal uterine bleeding    History of iron deficiency     History reviewed  No pertinent past medical history  1  Advanced Directive: na     2  Durable Power of  for Healthcare: na     3  Social History:               Marital History: single              Work Status: PA with Nephrology              Drug and alcohol History: none                  4  General Health: excellent              Regular Dental Visits:yes              Vision problems:none              Hearing Loss:none              Immunizations up to date: none                 Lifestyle:                           Healthy Diet:whole foods, non-processed, grass fed meat, organic vegetables                          Tobacco Use:none                          Regular exercise:strength 3x week with cardio 3x/month                                      PHQ-2/9 Depression Screening    Little interest or pleasure in doing things: 0 - not at all  Feeling down, depressed, or hopeless: 0 - not at all  PHQ-2 Score: 0  PHQ-2 Interpretation: Negative depression screen           Current Outpatient Medications   Medication Sig Dispense Refill    b complex vitamins capsule Take 1 capsule by mouth daily (Patient not taking: Reported on 1/27/2022 )      fexofenadine (ALLEGRA) 180 MG tablet Take 180 mg by mouth as needed (Patient not taking: Reported on 1/27/2022 )      Naproxen Sodium (Aleve) 220 MG CAPS Take by mouth as needed (Patient not taking: Reported on 1/27/2022 )       No current facility-administered medications for this visit       Allergies   Allergen Reactions    Cefaclor Hives    Cefprozil Hives    Cephalosporins Hives     Other reaction(s): hives     Immunization History   Administered Date(s) Administered    DTP 1992, 1990, 1992, 09/16/1993, 07/09/1997    HPV9 12/21/2016    Hep B, Adolescent or Pediatric 06/09/1997, 07/09/1997, 12/11/1997    Hib (PRP-T) 1992, 1992, 1992, 06/02/1993    INFLUENZA 11/30/2015, 09/28/2016    IPV 1992, 1992, 09/16/1993, 06/09/1997    MMR 06/02/1993, 06/09/1997    Td (adult), Unspecified 06/25/2007    Tdap 07/17/2015    Varicella 09/16/1996, 07/28/2010       Patient Care Team:  Eileen Shearer MD as PCP - General (Internal Medicine)    Review of Systems   Constitutional: Negative for activity change, diaphoresis, fatigue and fever  HENT: Negative for congestion, facial swelling, hearing loss, rhinorrhea, sinus pressure, sinus pain, sneezing, sore throat and voice change  Eyes: Negative for discharge and visual disturbance  Respiratory: Negative for cough, choking, chest tightness, shortness of breath, wheezing and stridor  Cardiovascular: Negative for chest pain, palpitations and leg swelling  Gastrointestinal: Negative for abdominal distention, abdominal pain, constipation, diarrhea, nausea and vomiting  Endocrine: Negative for polydipsia, polyphagia and polyuria  Genitourinary: Negative for difficulty urinating, dysuria, frequency and urgency  Musculoskeletal: Negative for arthralgias, back pain, gait problem, joint swelling, myalgias, neck pain and neck stiffness  Skin: Negative for color change, rash and wound  Neurological: Negative for dizziness, syncope, speech difficulty, weakness, light-headedness and headaches  Hematological: Negative for adenopathy  Does not bruise/bleed easily  Psychiatric/Behavioral: Negative for agitation, behavioral problems, confusion, hallucinations, sleep disturbance and suicidal ideas  The patient is not nervous/anxious  Physical Exam :  Physical Exam  Vitals and nursing note reviewed  Constitutional:       General: She is not in acute distress  Appearance: Normal appearance  She is well-developed and normal weight  She is not diaphoretic     HENT:      Head: Normocephalic and atraumatic  Right Ear: Tympanic membrane, ear canal and external ear normal       Left Ear: Tympanic membrane, ear canal and external ear normal       Nose: Nose normal       Right Sinus: No maxillary sinus tenderness or frontal sinus tenderness  Left Sinus: No maxillary sinus tenderness or frontal sinus tenderness  Mouth/Throat:      Mouth: Mucous membranes are moist       Pharynx: Oropharynx is clear  Uvula midline  No oropharyngeal exudate  Eyes:      General:         Right eye: No discharge  Left eye: No discharge  Conjunctiva/sclera: Conjunctivae normal       Pupils: Pupils are equal, round, and reactive to light  Neck:      Thyroid: No thyromegaly  Trachea: No tracheal deviation  Cardiovascular:      Rate and Rhythm: Normal rate and regular rhythm  Heart sounds: Normal heart sounds  No murmur heard  No friction rub  No gallop  Pulmonary:      Effort: Pulmonary effort is normal  No respiratory distress  Breath sounds: Normal breath sounds  No wheezing or rales  Abdominal:      General: Bowel sounds are normal  There is no distension  Palpations: Abdomen is soft  There is no mass  Tenderness: There is no abdominal tenderness  There is no guarding or rebound  Musculoskeletal:         General: No tenderness or deformity  Normal range of motion  Cervical back: Normal range of motion and neck supple  Right lower leg: No edema  Left lower leg: No edema  Lymphadenopathy:      Cervical: No cervical adenopathy  Skin:     General: Skin is warm and dry  Findings: No erythema or rash  Neurological:      Mental Status: She is alert and oriented to person, place, and time  Cranial Nerves: No cranial nerve deficit  Coordination: Coordination normal    Psychiatric:         Mood and Affect: Mood normal          Speech: Speech normal          Behavior: Behavior normal          Thought Content:  Thought content normal          Judgment: Judgment normal            Reviewed Updated [facility] Prior Wellness Visits:   Last Health Maintenance visit information was reviewed, patient interviewed , no change since last HM visit no  Last HM visit information was reviewed, patient interviewed and updates made to the record today no    Assessment and Plan:  1  Annual physical exam  CBC and differential    Comprehensive metabolic panel   2  Screening cholesterol level  Lipid panel   3   Screening for diabetes mellitus  HEMOGLOBIN A1C W/ EAG ESTIMATION       Health Maintenance Due   Topic Date Due    Hepatitis C Screening  Never done    COVID-19 Vaccine (1) Never done    HIV Screening  Never done    Influenza Vaccine (1) 09/01/2022    Annual Physical  09/13/2022

## 2023-01-10 DIAGNOSIS — H60.509 ACUTE OTITIS EXTERNA, UNSPECIFIED LATERALITY, UNSPECIFIED TYPE: Primary | ICD-10-CM

## 2023-01-10 RX ORDER — CIPROFLOXACIN AND DEXAMETHASONE 3; 1 MG/ML; MG/ML
4 SUSPENSION/ DROPS AURICULAR (OTIC) 2 TIMES DAILY
Qty: 7.5 ML | Refills: 1 | Status: SHIPPED | OUTPATIENT
Start: 2023-01-10

## 2023-03-15 ENCOUNTER — CONSULT (OUTPATIENT)
Dept: PLASTIC SURGERY | Facility: CLINIC | Age: 33
End: 2023-03-15

## 2023-03-15 VITALS
HEART RATE: 100 BPM | WEIGHT: 138 LBS | HEIGHT: 62 IN | TEMPERATURE: 98 F | BODY MASS INDEX: 25.4 KG/M2 | SYSTOLIC BLOOD PRESSURE: 122 MMHG | DIASTOLIC BLOOD PRESSURE: 84 MMHG

## 2023-03-15 DIAGNOSIS — L98.8 SKIN LESION OF BREAST: Primary | ICD-10-CM

## 2023-03-15 NOTE — PROGRESS NOTES
H&P Exam - Plastic Surgery   Dixon Concepcion 34 y o  female MRN: [id number]  Unit/Bed#:  Encounter: [id number]    Assessment/Plan     Assessment:  Pt has concerning lesion on the skin of her right breast    Plan:  Dr Laureen Camara explained the options for biopsy, including punch biopsy and excisional biopsy  He explained the differences, the risks and outcomes of each  Nusrat Never opted for the excisional biopsy  All risks, benefits, and options, including but not limited to, pain, scarring, bleeding, infection, asymmetry, contour deformity, damage to adjacent nerves, vessels, and organs, hematoma, seroma, paresthesias, anesthesia (temporary versus permanent), skin necrosis, fat necrosis, flap necrosis, wound dehiscence with need for healing by secondary intention and postoperative dressing changes, fistula formation, hypertrophic and/or keloid scar formation, recurrence, and need for revision and/or reoperation were fully explained to the patient  Pt expressed understanding, would like to undergo the procedure, and signed the consent today  History of Present Illness     HPI:  Dixon Concepcion is a 34 y o  female who presents with a concerning lesion of the skin of her right breast  She says she may have had it for 6-12 months  It does not itch, or bleed  She reports it is not uniform in color  She has had other lesions shave biopsied but says they resulted in hypertrophic scars  She would prefer a punch biopsy, which her derm could not offer  Review of Systems   Constitutional: Negative for chills and fever  HENT: Negative for ear pain and sore throat  Eyes: Negative for pain and visual disturbance  Respiratory: Negative for cough and shortness of breath  Cardiovascular: Negative for chest pain and palpitations  Gastrointestinal: Negative for abdominal pain and vomiting  Genitourinary: Negative for dysuria and hematuria  Musculoskeletal: Negative for arthralgias and back pain     Skin: Negative for color change and rash  See hpi   Neurological: Negative for seizures and syncope  All other systems reviewed and are negative  Historical Information   No past medical history on file  Past Surgical History:   Procedure Laterality Date   • MOLE REMOVAL Right 2013    right dorasal hand - benign   • WISDOM TOOTH EXTRACTION Bilateral      Social History   Social History     Substance and Sexual Activity   Alcohol Use Yes    Comment: "very seldom"     Social History     Substance and Sexual Activity   Drug Use Never     Social History     Tobacco Use   Smoking Status Never   Smokeless Tobacco Never     E-Cigarette/Vaping   • E-Cigarette Use Never User      E-Cigarette/Vaping Substances     Family History:   Family History   Problem Relation Age of Onset   • No Known Problems Mother    • Mitral valve prolapse Father    • No Known Problems Sister        Meds/Allergies     Current Outpatient Medications:   •  b complex vitamins capsule, Take 1 capsule by mouth daily (Patient not taking: Reported on 1/27/2022 ), Disp: , Rfl:   •  ciprofloxacin-dexamethasone (CIPRODEX) otic suspension, Administer 4 drops into the left ear 2 (two) times a day, Disp: 7 5 mL, Rfl: 1  •  fexofenadine (ALLEGRA) 180 MG tablet, Take 180 mg by mouth as needed (Patient not taking: Reported on 1/27/2022 ), Disp: , Rfl:   •  Naproxen Sodium (Aleve) 220 MG CAPS, Take by mouth as needed (Patient not taking: Reported on 1/27/2022 ), Disp: , Rfl:   Allergies   Allergen Reactions   • Cefaclor Hives   • Cefprozil Hives   • Cephalosporins Hives     Other reaction(s): hives       Objective   First Vitals:   @VSFIRST2(5,8,6,7,9,11,14,10:FIRST)@    Current Vitals:        [unfilled]    Invasive Devices     None                 Physical Exam  Vitals and nursing note reviewed  Constitutional:       General: She is not in acute distress  Appearance: She is well-developed  HENT:      Head: Normocephalic and atraumatic     Eyes: Conjunctiva/sclera: Conjunctivae normal    Cardiovascular:      Rate and Rhythm: Normal rate and regular rhythm  Heart sounds: No murmur heard  Pulmonary:      Effort: Pulmonary effort is normal  No respiratory distress  Breath sounds: Normal breath sounds  Chest:       Abdominal:      Palpations: Abdomen is soft  Tenderness: There is no abdominal tenderness  Musculoskeletal:         General: No swelling  Cervical back: Neck supple  Skin:     General: Skin is warm and dry  Capillary Refill: Capillary refill takes less than 2 seconds  Neurological:      Mental Status: She is alert  Psychiatric:         Mood and Affect: Mood normal          Lab Results: I have personally reviewed pertinent lab results  Imaging: I have personally reviewed pertinent reports  EKG, Pathology, and Other Studies: I have personally reviewed pertinent reports

## 2023-03-17 ENCOUNTER — PREP FOR PROCEDURE (OUTPATIENT)
Dept: PLASTIC SURGERY | Facility: CLINIC | Age: 33
End: 2023-03-17

## 2023-03-17 DIAGNOSIS — L98.8 SKIN LESION OF BREAST: Primary | ICD-10-CM

## 2023-03-21 ENCOUNTER — OFFICE VISIT (OUTPATIENT)
Dept: FAMILY MEDICINE CLINIC | Facility: CLINIC | Age: 33
End: 2023-03-21

## 2023-03-21 VITALS
HEART RATE: 94 BPM | TEMPERATURE: 99.7 F | HEIGHT: 62 IN | OXYGEN SATURATION: 98 % | WEIGHT: 142 LBS | DIASTOLIC BLOOD PRESSURE: 76 MMHG | SYSTOLIC BLOOD PRESSURE: 116 MMHG | BODY MASS INDEX: 26.13 KG/M2 | RESPIRATION RATE: 16 BRPM

## 2023-03-21 DIAGNOSIS — E66.3 OVERWEIGHT WITH BODY MASS INDEX (BMI) OF 25 TO 25.9 IN ADULT: ICD-10-CM

## 2023-03-21 DIAGNOSIS — Z00.00 ANNUAL PHYSICAL EXAM: ICD-10-CM

## 2023-03-21 DIAGNOSIS — Z13.1 SCREENING FOR DIABETES MELLITUS: ICD-10-CM

## 2023-03-21 DIAGNOSIS — F43.0 STRESS REACTION CAUSING MIXED DISTURBANCE OF EMOTION AND CONDUCT: ICD-10-CM

## 2023-03-21 DIAGNOSIS — Z73.0 BURN-OUT: Primary | ICD-10-CM

## 2023-03-21 DIAGNOSIS — Z13.220 SCREENING CHOLESTEROL LEVEL: ICD-10-CM

## 2023-03-21 NOTE — PROGRESS NOTES
[facility]        NAME: Soto Mora is a 34 y o  female  : 1990    MRN: [id number]  DATE: 2023  TIME: 11:06 AM    Assessment and Plan   Burn-out [Z73 0]  1  Burn-out        2  Stress reaction causing mixed disturbance of emotion and conduct  TSH, 3rd generation with Free T4 reflex    Vitamin D 25 hydroxy    Testosterone, free, total    Prolactin    Cortisol Level, AM Specimen      3  Screening cholesterol level  Lipid panel      4  Screening for diabetes mellitus  HEMOGLOBIN A1C W/ EAG ESTIMATION      5  Annual physical exam  Comprehensive metabolic panel    CBC and differential      6  Overweight with body mass index (BMI) of 25 to 25 9 in adult              Patient Instructions     Patient Instructions   FMLA paperwork completed  Get bloodwork done when convenient  Return in 6 weeks for follow up or sooner for concerns/questions          Chief Complaint     Chief Complaint   Patient presents with   • Follow-up     FMLA paperwork  History of Present Illness       Here to discuss burn out of her job  Her job has been very stressful and she no longer finds job in it  She needs FMLA paperwork filled out, willing to return for a 6 week follow up  She declines medication options  She has done an intensive 3 hours therapy session and plans to do this 1-4x/week  She is planning to start eating right (gained 10 pounds), exercising, enjoying nature, relaxing, etc   Her social support is very strong- has a fiance that is supportive of her decision to take FMLA, also very close with her parents and siblings  Review of Systems   Review of Systems   Constitutional: Negative for activity change, diaphoresis, fatigue and fever  HENT: Negative for congestion, facial swelling, hearing loss, rhinorrhea, sinus pressure, sinus pain, sneezing, sore throat and voice change  Eyes: Negative for discharge and visual disturbance     Respiratory: Negative for cough, choking, chest tightness, shortness of breath, wheezing and stridor  Cardiovascular: Negative for chest pain, palpitations and leg swelling  Gastrointestinal: Negative for abdominal distention, abdominal pain, constipation, diarrhea, nausea and vomiting  Endocrine: Negative for polydipsia, polyphagia and polyuria  Genitourinary: Negative for difficulty urinating, dysuria, frequency and urgency  Musculoskeletal: Negative for arthralgias, back pain, gait problem, joint swelling, myalgias, neck pain and neck stiffness  Skin: Negative for color change, rash and wound  Neurological: Negative for dizziness, syncope, speech difficulty, weakness, light-headedness and headaches  Hematological: Negative for adenopathy  Does not bruise/bleed easily  Psychiatric/Behavioral: Positive for dysphoric mood and sleep disturbance  Negative for agitation, behavioral problems, confusion, hallucinations and suicidal ideas  The patient is nervous/anxious  Current Medications     No current outpatient medications on file  Current Allergies     Allergies as of 03/21/2023 - Reviewed 03/21/2023   Allergen Reaction Noted   • Cefaclor Hives 07/17/2015   • Cefprozil Hives 07/17/2015   • Cephalosporins Hives 07/17/2015            The following portions of the patient's history were reviewed and updated as appropriate: allergies, current medications, past family history, past medical history, past social history, past surgical history and problem list      No past medical history on file  Past Surgical History:   Procedure Laterality Date   • MOLE REMOVAL Right 2013    right dorasal hand - benign   • WISDOM TOOTH EXTRACTION Bilateral        Family History   Problem Relation Age of Onset   • No Known Problems Mother    • Mitral valve prolapse Father    • No Known Problems Sister          Medications have been verified          Objective   /76   Pulse 94   Temp 99 7 °F (37 6 °C)   Resp 16   Ht 5' 2" (1 575 m)   Wt 64 4 kg (142 lb)   SpO2 98%   BMI 25 97 kg/m²        Physical Exam     Physical Exam  Vitals and nursing note reviewed  Constitutional:       General: She is not in acute distress  Appearance: She is well-developed  She is not ill-appearing or diaphoretic  Pulmonary:      Effort: Pulmonary effort is normal  No respiratory distress  Skin:     Coloration: Skin is not pale  Neurological:      Mental Status: She is alert and oriented to person, place, and time  She is not disoriented  Psychiatric:         Attention and Perception: Attention normal          Mood and Affect: Mood is anxious and depressed  Affect is tearful  Speech: Speech normal          Behavior: Behavior normal  Behavior is cooperative  Thought Content: Thought content normal          Cognition and Memory: Cognition and memory normal          Judgment: Judgment normal        BMI Counseling: Body mass index is 25 97 kg/m²  The BMI is above normal  Nutrition recommendations include decreasing portion sizes, encouraging healthy choices of fruits and vegetables, decreasing fast food intake, consuming healthier snacks, limiting drinks that contain sugar, moderation in carbohydrate intake and increasing intake of lean protein  Exercise recommendations include exercising 3-5 times per week  Rationale for BMI follow-up plan is due to patient being overweight or obese  Depression Screening and Follow-up Plan: Patient's depression screening was positive with a PHQ-2 score of 3  Their PHQ-9 score was 12  Clincally patient does not have depression  No treatment is required

## 2023-03-21 NOTE — PATIENT INSTRUCTIONS
FMLA paperwork completed  Get bloodwork done when convenient  Return in 6 weeks for follow up or sooner for concerns/questions

## 2023-03-22 ENCOUNTER — APPOINTMENT (OUTPATIENT)
Dept: LAB | Facility: CLINIC | Age: 33
End: 2023-03-22

## 2023-03-22 DIAGNOSIS — Z00.00 ANNUAL PHYSICAL EXAM: ICD-10-CM

## 2023-03-22 DIAGNOSIS — Z13.220 SCREENING CHOLESTEROL LEVEL: ICD-10-CM

## 2023-03-22 DIAGNOSIS — Z13.1 SCREENING FOR DIABETES MELLITUS: ICD-10-CM

## 2023-03-22 DIAGNOSIS — F43.0 STRESS REACTION CAUSING MIXED DISTURBANCE OF EMOTION AND CONDUCT: ICD-10-CM

## 2023-03-22 LAB
25(OH)D3 SERPL-MCNC: 22.6 NG/ML (ref 30–100)
ALBUMIN SERPL BCP-MCNC: 3.9 G/DL (ref 3.5–5)
ALP SERPL-CCNC: 40 U/L (ref 46–116)
ALT SERPL W P-5'-P-CCNC: 26 U/L (ref 12–78)
ANION GAP SERPL CALCULATED.3IONS-SCNC: 2 MMOL/L (ref 4–13)
AST SERPL W P-5'-P-CCNC: 16 U/L (ref 5–45)
BASOPHILS # BLD AUTO: 0.03 THOUSANDS/ÂΜL (ref 0–0.1)
BASOPHILS NFR BLD AUTO: 1 % (ref 0–1)
BILIRUB SERPL-MCNC: 0.45 MG/DL (ref 0.2–1)
BUN SERPL-MCNC: 15 MG/DL (ref 5–25)
CALCIUM SERPL-MCNC: 8.9 MG/DL (ref 8.3–10.1)
CHLORIDE SERPL-SCNC: 108 MMOL/L (ref 96–108)
CHOLEST SERPL-MCNC: 217 MG/DL
CO2 SERPL-SCNC: 26 MMOL/L (ref 21–32)
CORTIS AM PEAK SERPL-MCNC: 15.3 UG/DL (ref 4.2–22.4)
CREAT SERPL-MCNC: 0.9 MG/DL (ref 0.6–1.3)
EOSINOPHIL # BLD AUTO: 0.32 THOUSAND/ÂΜL (ref 0–0.61)
EOSINOPHIL NFR BLD AUTO: 6 % (ref 0–6)
ERYTHROCYTE [DISTWIDTH] IN BLOOD BY AUTOMATED COUNT: 12.2 % (ref 11.6–15.1)
EST. AVERAGE GLUCOSE BLD GHB EST-MCNC: 100 MG/DL
GFR SERPL CREATININE-BSD FRML MDRD: 85 ML/MIN/1.73SQ M
GLUCOSE P FAST SERPL-MCNC: 96 MG/DL (ref 65–99)
HBA1C MFR BLD: 5.1 %
HCT VFR BLD AUTO: 38.5 % (ref 34.8–46.1)
HDLC SERPL-MCNC: 67 MG/DL
HGB BLD-MCNC: 12.8 G/DL (ref 11.5–15.4)
IMM GRANULOCYTES # BLD AUTO: 0.02 THOUSAND/UL (ref 0–0.2)
IMM GRANULOCYTES NFR BLD AUTO: 0 % (ref 0–2)
LDLC SERPL CALC-MCNC: 134 MG/DL (ref 0–100)
LYMPHOCYTES # BLD AUTO: 2.14 THOUSANDS/ÂΜL (ref 0.6–4.47)
LYMPHOCYTES NFR BLD AUTO: 38 % (ref 14–44)
MCH RBC QN AUTO: 30.6 PG (ref 26.8–34.3)
MCHC RBC AUTO-ENTMCNC: 33.2 G/DL (ref 31.4–37.4)
MCV RBC AUTO: 92 FL (ref 82–98)
MONOCYTES # BLD AUTO: 0.54 THOUSAND/ÂΜL (ref 0.17–1.22)
MONOCYTES NFR BLD AUTO: 10 % (ref 4–12)
NEUTROPHILS # BLD AUTO: 2.66 THOUSANDS/ÂΜL (ref 1.85–7.62)
NEUTS SEG NFR BLD AUTO: 45 % (ref 43–75)
NONHDLC SERPL-MCNC: 150 MG/DL
NRBC BLD AUTO-RTO: 0 /100 WBCS
PLATELET # BLD AUTO: 207 THOUSANDS/UL (ref 149–390)
PMV BLD AUTO: 11.7 FL (ref 8.9–12.7)
POTASSIUM SERPL-SCNC: 4.1 MMOL/L (ref 3.5–5.3)
PROLACTIN SERPL-MCNC: 40 NG/ML
PROT SERPL-MCNC: 7.5 G/DL (ref 6.4–8.4)
RBC # BLD AUTO: 4.18 MILLION/UL (ref 3.81–5.12)
SODIUM SERPL-SCNC: 136 MMOL/L (ref 135–147)
TRIGL SERPL-MCNC: 79 MG/DL
TSH SERPL DL<=0.05 MIU/L-ACNC: 1.75 UIU/ML (ref 0.45–4.5)
WBC # BLD AUTO: 5.71 THOUSAND/UL (ref 4.31–10.16)

## 2023-03-24 LAB
TESTOST FREE SERPL-MCNC: 1.4 PG/ML (ref 0–4.2)
TESTOST SERPL-MCNC: 19 NG/DL (ref 8–60)

## 2023-03-27 DIAGNOSIS — Z11.52 ENCOUNTER FOR SCREENING FOR COVID-19: Primary | ICD-10-CM

## 2023-04-14 ENCOUNTER — HOSPITAL ENCOUNTER (OUTPATIENT)
Facility: HOSPITAL | Age: 33
Setting detail: OUTPATIENT SURGERY
Discharge: HOME | End: 2023-04-14
Attending: STUDENT IN AN ORGANIZED HEALTH CARE EDUCATION/TRAINING PROGRAM | Admitting: STUDENT IN AN ORGANIZED HEALTH CARE EDUCATION/TRAINING PROGRAM

## 2023-04-14 VITALS
RESPIRATION RATE: 20 BRPM | SYSTOLIC BLOOD PRESSURE: 105 MMHG | WEIGHT: 141.98 LBS | TEMPERATURE: 98.5 F | HEIGHT: 62 IN | DIASTOLIC BLOOD PRESSURE: 55 MMHG | OXYGEN SATURATION: 100 % | BODY MASS INDEX: 26.13 KG/M2 | HEART RATE: 57 BPM

## 2023-04-14 DIAGNOSIS — L98.8 SKIN LESION OF BREAST: ICD-10-CM

## 2023-04-14 DIAGNOSIS — L98.8 SKIN LESION OF BREAST: Primary | ICD-10-CM

## 2023-04-14 RX ORDER — IBUPROFEN 600 MG/1
600 TABLET ORAL EVERY 8 HOURS PRN
Qty: 15 TABLET | Refills: 0 | Status: SHIPPED | OUTPATIENT
Start: 2023-04-14

## 2023-04-14 RX ORDER — MELATONIN
1000 DAILY
COMMUNITY

## 2023-04-14 RX ORDER — ACETAMINOPHEN 500 MG
1000 TABLET ORAL 3 TIMES DAILY PRN
Qty: 15 TABLET | Refills: 0 | Status: SHIPPED | OUTPATIENT
Start: 2023-04-14 | End: 2023-04-19

## 2023-04-14 RX ORDER — ACETAMINOPHEN 325 MG/1
975 TABLET ORAL ONCE
Status: DISCONTINUED | OUTPATIENT
Start: 2023-04-14 | End: 2023-04-14 | Stop reason: HOSPADM

## 2023-04-14 RX ORDER — LIDOCAINE HYDROCHLORIDE AND EPINEPHRINE 10; 10 MG/ML; UG/ML
INJECTION, SOLUTION INFILTRATION; PERINEURAL AS NEEDED
Status: DISCONTINUED | OUTPATIENT
Start: 2023-04-14 | End: 2023-04-14 | Stop reason: HOSPADM

## 2023-04-14 RX ORDER — DIPHENOXYLATE HYDROCHLORIDE AND ATROPINE SULFATE 2.5; .025 MG/1; MG/1
1 TABLET ORAL DAILY
COMMUNITY

## 2023-04-14 RX ORDER — GABAPENTIN 300 MG/1
300 CAPSULE ORAL ONCE
Status: DISCONTINUED | OUTPATIENT
Start: 2023-04-14 | End: 2023-04-14 | Stop reason: HOSPADM

## 2023-04-14 NOTE — DISCHARGE INSTR - AVS FIRST PAGE
Discharge instructions    -Over-the-counter tylenol or ibuprofen for pain   -Do not get wet for 24 hours  After 24 hours, can splash wet and pat dry  Apply antibiotic ointment to incision (bacitracin)  Do not submerge incisions (no baths, pools, hottubs)     -Keep incision clean and covered   -No strenuous activity, no heavy lifting (nothing over 5 lbs), no bending over, nothing that increases heart rate as this can increase bleeding, bruising, and swelling   -Resume regular diet and home medications  -Call Plastic Surgery office to schedule post-op follow in 7 days for suture removal       Jeny Kaufman MD   [facility]astic [facility]   [address] [facility] [address], [address]   Office: [phone]

## 2023-04-14 NOTE — OP NOTE
OPERATIVE REPORT  PATIENT NAME: Diana Gillespie    :  1990  MRN: [id number]  Pt Location: [facility] OR ROOM 03    SURGERY DATE: 2023    Surgeon(s) and Role:     * Charisma Case MD - Primary     * Joanna García MD - Assisting    Preop Diagnosis:  Skin lesion of breast [L98 8]    Post-Op Diagnosis Codes:     * Skin lesion of breast [L98 8]    Procedure(s):  1  Excision of right breast melanocytic nevus (1 2x0 5 cm) with complex closure 1 5 cm    Specimen(s):  ID Type Source Tests Collected by Time Destination   1 :  Tissue Breast, Right TISSUE EXAM Charisma Case MD 2023  2:00 PM        Estimated Blood Loss:   Minimal    Drains:  * No LDAs found *    Anesthesia Type:   Local    Operative Indications:  Skin lesion of breast [L98 8]    Operative Findings:  Excision of right breast melanocytic nevus (1 2x0 5 cm) with complex closure length 1 5 cm    Complications:   None    Procedure and Technique:  Patient was brought to the operating room, transferred to the operating table in supine fashion  A timeout was performed at which point all patient identifiers were deemed to be correct  The chest was prepped and draped in the normal sterile fashion  2 cc of 1% lidocaine with epi was used to locally infiltrate the area  The melanocytic lesion was excised and sent for path  Irrigation and hemostasis was obtained  I then proceeded with complex closure  The surrounding skin flaps were undermined at least 1 5 cm in all directions to allow for tension free closure of the wound  I then proceeded with complex closure with 4-0 vicryl for dermis and 4-0 monocryl subcuticular suture followed by skin glue  This concluded the procedure  Patient tolerated the procedure well without complications  At the end of the case, all sponge, needle, and instrument counts were correct  Patient was awakened from anesthesia and taken to the PACU in stable condition      I was present for the entire procedure, A qualified resident physician was available and A physician assistant was not required during the procedure for retraction, tissue handling, dissection and suturing        Patient Disposition:  PACU         SIGNATURE: Valentino Walker MD  DATE: April 14, 2023  TIME: 5:15 PM

## 2023-04-27 ENCOUNTER — OFFICE VISIT (OUTPATIENT)
Dept: PLASTIC SURGERY | Facility: CLINIC | Age: 33
End: 2023-04-27

## 2023-04-27 DIAGNOSIS — L98.8 SKIN LESION OF BREAST: Primary | ICD-10-CM

## 2023-04-28 NOTE — PROGRESS NOTES
[facility] Plastic and Reconstructive Surgery  [address], [facility], [place], [address] [facility]  [phone]    Patient Identification: Carlos Mackenzie is a 34 y o  female     History of Present Illness: The patient is a 34y o   year-old female  who presents to the office for post-op visit  Patient is 13 days s/p Excisional Biopsy Right Breast Skin Lesion With Complex Closure - Right  on 4/14/2023 by Dr Tomas Dolan  Pt states she has no pain and has kept the area covered with a band-aid  She has returned to normal activities and denies any fevers, chills, signs of infection or any other complaints at this time  History reviewed  No pertinent past medical history  Patient Active Problem List   Diagnosis   • Abnormal uterine bleeding   • History of iron deficiency   • Skin lesion of right breast        Past Surgical History:   Procedure Laterality Date   • MOLE REMOVAL Right 2013    right dorasal hand - benign   • MS EXC B9 LESION MRGN XCP SK TG T/A/L >4 0 CM Right 4/14/2023    Procedure: EXCISIONAL BIOPSY RIGHT BREAST SKIN LESION WITH COMPLEX CLOSURE;  Surgeon: Cherelle Jauregui MD;  Location: [facility] OR;  Service: Plastics   • WISDOM TOOTH EXTRACTION Bilateral         Allergies   Allergen Reactions   • Cefaclor Hives   • Cefprozil Hives   • Cephalosporins Hives     Other reaction(s): hives        Current Outpatient Medications on File Prior to Visit   Medication Sig Dispense Refill   • cholecalciferol (VITAMIN D3) 1,000 units tablet Take 1,000 Units by mouth daily     • ibuprofen (MOTRIN) 600 mg tablet Take 1 tablet (600 mg total) by mouth every 8 (eight) hours as needed for mild pain 15 tablet 0   • multivitamin (THERAGRAN) TABS Take 1 tablet by mouth daily       No current facility-administered medications on file prior to visit          Tobacco Use: Low Risk    • Smoking Tobacco Use: Never   • Smokeless Tobacco Use: Never   • Passive Exposure: Not on file        Review of Systems  Constitutional: Denies fevers, chills, pain  Skin: Denies any warmth, acute edema, erythema, or mucopurulent drainage  Physical Exam   Right Breast: Surgical incision is clean and dry  Wound is dehisced without signs of edema, erythema or drainage  Monocryl sutures seen and removed  Area was irrigated with sterile saline  Assessment and Plan:  The patient is an 34y o   year-old female who presents to the office for post-op visit  Patient is 13 days s/p Excisional Biopsy Right Breast Skin Lesion With Complex Closure - Right  on 4/14/2023 by Dr Víctor Nicole    -At today's visit patient was encouraged to apply bacitracin and keep covered with band-aid  Incision is small and will likely heal on its own without complications  She can call the office with any concerns or new symptoms   -The patient is to return in 1 week for wound check and final results of pathology  - The patient is to call the office with any questions or concerns  All of the patient's questions were answered at this time and they agree with the plan of care          Caryle Ness, PA-C  [facility] Plastic and Re[facility]

## 2023-05-08 ENCOUNTER — OFFICE VISIT (OUTPATIENT)
Dept: FAMILY MEDICINE CLINIC | Facility: CLINIC | Age: 33
End: 2023-05-08

## 2023-05-08 ENCOUNTER — OFFICE VISIT (OUTPATIENT)
Dept: PLASTIC SURGERY | Facility: CLINIC | Age: 33
End: 2023-05-08

## 2023-05-08 ENCOUNTER — APPOINTMENT (OUTPATIENT)
Dept: LAB | Facility: CLINIC | Age: 33
End: 2023-05-08

## 2023-05-08 VITALS
HEART RATE: 70 BPM | TEMPERATURE: 98 F | RESPIRATION RATE: 20 BRPM | HEIGHT: 62 IN | OXYGEN SATURATION: 99 % | SYSTOLIC BLOOD PRESSURE: 106 MMHG | DIASTOLIC BLOOD PRESSURE: 62 MMHG | WEIGHT: 142 LBS | BODY MASS INDEX: 26.13 KG/M2

## 2023-05-08 DIAGNOSIS — E55.9 VITAMIN D DEFICIENCY: ICD-10-CM

## 2023-05-08 DIAGNOSIS — Z73.0 BURN-OUT: ICD-10-CM

## 2023-05-08 DIAGNOSIS — L98.8 SKIN LESION OF BREAST: Primary | ICD-10-CM

## 2023-05-08 DIAGNOSIS — Z11.52 ENCOUNTER FOR SCREENING FOR COVID-19: ICD-10-CM

## 2023-05-08 DIAGNOSIS — Z00.00 ANNUAL PHYSICAL EXAM: Primary | ICD-10-CM

## 2023-05-08 NOTE — PATIENT INSTRUCTIONS
Work clearance note given and faxed  Continue Vitamin D 5,000 IU daily  Call for problems/concerns/questions

## 2023-05-08 NOTE — PROGRESS NOTES
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  See note         Note:  The nevus does not extend to the tissue edges in the sections planes examined  Spitting sutures and a minor amount of eschar was removed from the incision site today  Incision site remains open, though shallowly  Patient will apply Aquaphor to the incision site  She elects to follow-up in the office as needed with any questions or concerns  Diagnoses and all orders for this visit:    Skin lesion of right breast  -     Ambulatory Referral to Dermatology; Future          Subjective:     Patient ID: Mikala Lloyd is a 34 y o  female  HPI     Patient states she has no issues or concerns today  She is pleased with the progress in healing of her incisional wound  Review of Systems    See HPI    Objective:     Physical Exam      Incision with overlying eschar in the central portion  This was gently debrided with underlying spitting suture noted  Remaining wound is shallow  No tracking/tunneling

## 2023-05-08 NOTE — LETTER
May 8, 2023     Patient: Sarah Sorenson  YOB: 1990  Date of Visit: 5/8/2023      To Whom it May Concern:    Pietro Birmingham is under my professional care  Dionisio Bob was seen in my office on 5/8/2023  Dionisio Bob may return to work on 5/15/2023 with no restrictions  If you have any questions or concerns, please don't hesitate to call           Sincerely,          KOMAL Car        CC: No Recipients

## 2023-05-08 NOTE — PROGRESS NOTES
[facility] Primary Care        NAME: Geovanny Maradiaga is a 34 y o  female  : 1990    MRN: [id number]  DATE: May 8, 2023  TIME: 8:27 AM    Assessment and Plan   Annual physical exam [F64 94]  1  Annual physical exam        2  Burn-out        3  Vitamin D deficiency              Patient Instructions     Patient Instructions   Work clearance note given and faxed  Continue Vitamin D 5,000 IU daily  Call for problems/concerns/questions          Chief Complaint     Chief Complaint   Patient presents with   • Physical Exam   • Follow-up         History of Present Illness       Here for follow up/annual physical- FMLA  She focused on her nutrition, therapy, and medication  Will be returning to work on Monday and needs clearance from her FMLA  Reviewed recent bloodwork- Prolactin slightly high at 40, she will discussing this with her GYN, vitamin D low at 22- she started taking 5,000 IU daily      Review of Systems   Review of Systems   Constitutional: Negative for activity change, diaphoresis, fatigue and fever  HENT: Negative for congestion, facial swelling, hearing loss, rhinorrhea, sinus pressure, sinus pain, sneezing, sore throat and voice change  Eyes: Negative for discharge and visual disturbance  Respiratory: Negative for cough, choking, chest tightness, shortness of breath, wheezing and stridor  Cardiovascular: Negative for chest pain, palpitations and leg swelling  Gastrointestinal: Negative for abdominal distention, abdominal pain, constipation, diarrhea, nausea and vomiting  Endocrine: Negative for polydipsia, polyphagia and polyuria  Genitourinary: Negative for difficulty urinating, dysuria, frequency and urgency  Musculoskeletal: Negative for arthralgias, back pain, gait problem, joint swelling, myalgias, neck pain and neck stiffness  Skin: Negative for color change, rash and wound     Neurological: Negative for dizziness, syncope, speech difficulty, weakness, "light-headedness and headaches  Hematological: Negative for adenopathy  Does not bruise/bleed easily  Psychiatric/Behavioral: Negative for agitation, behavioral problems, confusion, hallucinations, sleep disturbance and suicidal ideas  The patient is not nervous/anxious  Current Medications       Current Outpatient Medications:   •  cholecalciferol (VITAMIN D3) 1,000 units tablet, Take 5,000 Units by mouth daily, Disp: , Rfl:   •  ibuprofen (MOTRIN) 600 mg tablet, Take 1 tablet (600 mg total) by mouth every 8 (eight) hours as needed for mild pain, Disp: 15 tablet, Rfl: 0  •  multivitamin (THERAGRAN) TABS, Take 1 tablet by mouth daily, Disp: , Rfl:     Current Allergies     Allergies as of 05/08/2023 - Reviewed 05/08/2023   Allergen Reaction Noted   • Cefaclor Hives 07/17/2015   • Cefprozil Hives 07/17/2015   • Cephalosporins Hives 07/17/2015            The following portions of the patient's history were reviewed and updated as appropriate: allergies, current medications, past family history, past medical history, past social history, past surgical history and problem list      History reviewed  No pertinent past medical history  Past Surgical History:   Procedure Laterality Date   • MOLE REMOVAL Right 2013    right dorasal hand - benign   • [place] EXC B9 LESION MRGN XCP SK TG T/A/L >4 0 CM Right 4/14/2023    Procedure: EXCISIONAL BIOPSY RIGHT BREAST SKIN LESION WITH COMPLEX CLOSURE;  Surgeon: Myles Silver MD;  Location:  MAIN OR;  Service: Plastics   • WISDOM TOOTH EXTRACTION Bilateral        Family History   Problem Relation Age of Onset   • No Known Problems Mother    • Mitral valve prolapse Father    • No Known Problems Sister          Medications have been verified          Objective   /62   Pulse 70   Temp 98 °F (36 7 °C) (Tympanic)   Resp 20   Ht 5' 2\" (1 575 m)   Wt 64 4 kg (142 lb)   SpO2 99%   BMI 25 97 kg/m²        Physical Exam     Physical Exam  Vitals and nursing note " reviewed  Constitutional:       General: She is not in acute distress  Appearance: She is well-developed  She is not diaphoretic  HENT:      Head: Normocephalic and atraumatic  Right Ear: Tympanic membrane, ear canal and external ear normal       Left Ear: Tympanic membrane, ear canal and external ear normal       Nose: Nose normal       Mouth/Throat:      Mouth: Mucous membranes are moist       Pharynx: Oropharynx is clear  Uvula midline  Eyes:      General:         Right eye: No discharge  Left eye: No discharge  Conjunctiva/sclera: Conjunctivae normal       Pupils: Pupils are equal, round, and reactive to light  Neck:      Thyroid: No thyromegaly  Cardiovascular:      Rate and Rhythm: Normal rate and regular rhythm  Heart sounds: Normal heart sounds  No murmur heard  No friction rub  No gallop  Pulmonary:      Effort: Pulmonary effort is normal  No respiratory distress  Breath sounds: Normal breath sounds  No wheezing or rales  Musculoskeletal:         General: No tenderness or deformity  Normal range of motion  Cervical back: Normal range of motion and neck supple  Right lower leg: No edema  Left lower leg: No edema  Skin:     General: Skin is warm and dry  Findings: No erythema or rash  Neurological:      Mental Status: She is alert and oriented to person, place, and time  Cranial Nerves: No cranial nerve deficit  Coordination: Coordination normal    Psychiatric:         Mood and Affect: Mood normal          Behavior: Behavior normal          Thought Content:  Thought content normal          Judgment: Judgment normal

## 2023-05-10 LAB
SARS-COV-2 SEMI-QUANT IGG AB: <13 AU/ML
SARS-COV-2 SPIKE AB INTERP CONTAINING ATTACHED ABBREV COVDSN: NEGATIVE

## 2023-12-12 ENCOUNTER — IMMUNIZATIONS (OUTPATIENT)
Dept: FAMILY MEDICINE CLINIC | Facility: CLINIC | Age: 33
End: 2023-12-12
Payer: COMMERCIAL

## 2023-12-12 DIAGNOSIS — Z23 ENCOUNTER FOR IMMUNIZATION: Primary | ICD-10-CM

## 2023-12-12 PROCEDURE — 90471 IMMUNIZATION ADMIN: CPT

## 2023-12-12 PROCEDURE — 90686 IIV4 VACC NO PRSV 0.5 ML IM: CPT

## 2023-12-22 ENCOUNTER — OFFICE VISIT (OUTPATIENT)
Dept: FAMILY MEDICINE CLINIC | Facility: CLINIC | Age: 33
End: 2023-12-22

## 2023-12-22 VITALS
TEMPERATURE: 98.2 F | HEART RATE: 71 BPM | HEIGHT: 62 IN | WEIGHT: 146.2 LBS | SYSTOLIC BLOOD PRESSURE: 100 MMHG | RESPIRATION RATE: 20 BRPM | BODY MASS INDEX: 26.91 KG/M2 | DIASTOLIC BLOOD PRESSURE: 64 MMHG | OXYGEN SATURATION: 100 %

## 2023-12-22 DIAGNOSIS — J02.9 PHARYNGITIS, UNSPECIFIED ETIOLOGY: Primary | ICD-10-CM

## 2023-12-22 DIAGNOSIS — J02.9 PHARYNGITIS, UNSPECIFIED ETIOLOGY: ICD-10-CM

## 2023-12-22 LAB — S PYO DNA THROAT QL NAA+PROBE: NOT DETECTED

## 2023-12-22 PROCEDURE — 87070 CULTURE OTHR SPECIMN AEROBIC: CPT | Performed by: NURSE PRACTITIONER

## 2023-12-22 RX ORDER — FLUTICASONE PROPIONATE 50 MCG
1 SPRAY, SUSPENSION (ML) NASAL DAILY
COMMUNITY

## 2023-12-22 RX ORDER — PREDNISONE 20 MG/1
20 TABLET ORAL 2 TIMES DAILY WITH MEALS
Qty: 10 TABLET | Refills: 0 | Status: SHIPPED | OUTPATIENT
Start: 2023-12-22 | End: 2023-12-27

## 2023-12-22 RX ORDER — AMOXICILLIN 500 MG/1
500 CAPSULE ORAL EVERY 12 HOURS SCHEDULED
Qty: 20 CAPSULE | Refills: 0 | Status: SHIPPED | OUTPATIENT
Start: 2023-12-22 | End: 2024-01-01

## 2023-12-22 NOTE — PROGRESS NOTES
[facility] Primary Care        NAME: Sirena Shepherd is a 33 y.o. female  : 1990    MRN: [id number]  DATE: 2023  TIME: 12:46 PM    Assessment and Plan   Pharyngitis, unspecified etiology [J02.9]  1. Pharyngitis, unspecified etiology  POCT rapid PCR strepA    amoxicillin (AMOXIL) 500 mg capsule    predniSONE 20 mg tablet        1. Pharyngitis, unspecified etiology  - POCT rapid PCR strepA  - amoxicillin (AMOXIL) 500 mg capsule; Take 1 capsule (500 mg total) by mouth every 12 (twelve) hours for 10 days  Dispense: 20 capsule; Refill: 0  - predniSONE 20 mg tablet; Take 1 tablet (20 mg total) by mouth 2 (two) times a day with meals for 5 days  Dispense: 10 tablet; Refill: 0      Patient Instructions     There are no Patient Instructions on file for this visit.        Chief Complaint     Chief Complaint   Patient presents with    Sore Throat     No coughing, fever, congestion. Just lymph node swelling and sore throat. Started yesterday morning. Not taking anything OTC for it.         History of Present Illness        Patient here with c/o sore throat. Home COVID test negative.   Enlarged lymph nodes.  Feeling good.  Denies bodyaches , fevers. Has been exposed at work.         Review of Systems   Review of Systems   Constitutional: Negative.  Negative for chills, fatigue and fever.   HENT:  Positive for postnasal drip and sore throat.    Gastrointestinal:  Negative for abdominal pain, diarrhea and nausea.   Musculoskeletal:  Negative for arthralgias and joint swelling.   Skin: Negative.    Neurological:  Negative for dizziness and headaches.   Hematological:  Positive for adenopathy.   Psychiatric/Behavioral: Negative.         PHQ-2/9 Depression Screening    Little interest or pleasure in doing things: 0 - not at all  Feeling down, depressed, or hopeless: 0 - not at all  PHQ-2 Score: 0  PHQ-2 Interpretation: Negative depression screen        Current Medications       Current Outpatient  "Medications:     amoxicillin (AMOXIL) 500 mg capsule, Take 1 capsule (500 mg total) by mouth every 12 (twelve) hours for 10 days, Disp: 20 capsule, Rfl: 0    cholecalciferol (VITAMIN D3) 1,000 units tablet, Take 5,000 Units by mouth daily, Disp: , Rfl:     fluticasone (FLONASE) 50 mcg/act nasal spray, 1 spray into each nostril daily, Disp: , Rfl:     multivitamin (THERAGRAN) TABS, Take 1 tablet by mouth daily, Disp: , Rfl:     predniSONE 20 mg tablet, Take 1 tablet (20 mg total) by mouth 2 (two) times a day with meals for 5 days, Disp: 10 tablet, Rfl: 0    ibuprofen (MOTRIN) 600 mg tablet, Take 1 tablet (600 mg total) by mouth every 8 (eight) hours as needed for mild pain, Disp: 15 tablet, Rfl: 0    Current Allergies     Allergies as of 12/22/2023 - Reviewed 12/22/2023   Allergen Reaction Noted    Cefaclor Hives 07/17/2015    Cefprozil Hives 07/17/2015    Cephalosporins Hives 07/17/2015            The following portions of the patient's history were reviewed and updated as appropriate: allergies, current medications, past family history, past medical history, past social history, past surgical history and problem list.     History reviewed. No pertinent past medical history.    Past Surgical History:   Procedure Laterality Date    MOLE REMOVAL Right 2013    right dorasal hand - benign    [place] EXC B9 LESION MRGN XCP SK TG T/A/L >4.0 CM Right 4/14/2023    Procedure: EXCISIONAL BIOPSY RIGHT BREAST SKIN LESION WITH COMPLEX CLOSURE;  Surgeon: Liban Barnett MD;  Location:  MAIN OR;  Service: Plastics    WISDOM TOOTH EXTRACTION Bilateral        Family History   Problem Relation Age of Onset    No Known Problems Mother     Mitral valve prolapse Father     No Known Problems Sister          Medications have been verified.        Objective   /64   Pulse 71   Temp 98.2 °F (36.8 °C) (Tympanic)   Resp 20   Ht 5' 2\" (1.575 m)   Wt 66.3 kg (146 lb 3.2 oz)   SpO2 100%   BMI 26.74 kg/m²        Physical Exam "     Physical Exam  Vitals and nursing note reviewed.   Constitutional:       General: She is not in acute distress.     Appearance: Normal appearance. She is well-developed. She is not ill-appearing.   HENT:      Head: Normocephalic and atraumatic.      Nose: No congestion or rhinorrhea.      Mouth/Throat:      Mouth: Mucous membranes are moist. No oral lesions.      Pharynx: Oropharynx is clear. Uvula midline. Posterior oropharyngeal erythema present. No pharyngeal swelling, oropharyngeal exudate or uvula swelling.   Eyes:      General: Lids are normal.   Cardiovascular:      Rate and Rhythm: Normal rate and regular rhythm.      Heart sounds: Normal heart sounds, S1 normal and S2 normal. No murmur heard.     No friction rub. No gallop.   Pulmonary:      Effort: Pulmonary effort is normal. No respiratory distress.      Breath sounds: Normal breath sounds. No decreased breath sounds or wheezing.   Musculoskeletal:         General: No tenderness or deformity. Normal range of motion.   Lymphadenopathy:      Head:      Right side of head: Tonsillar adenopathy present.      Left side of head: Tonsillar adenopathy present.   Skin:     General: Skin is warm.      Findings: No erythema or rash.   Neurological:      Mental Status: She is alert and oriented to person, place, and time.   Psychiatric:         Behavior: Behavior normal. Behavior is cooperative.         Thought Content: Thought content normal.

## 2023-12-24 LAB — BACTERIA THROAT CULT: NORMAL

## 2024-03-23 ENCOUNTER — OFFICE VISIT (OUTPATIENT)
Dept: URGENT CARE | Facility: MEDICAL CENTER | Age: 34
End: 2024-03-23
Payer: COMMERCIAL

## 2024-03-23 VITALS
RESPIRATION RATE: 18 BRPM | TEMPERATURE: 100.6 F | DIASTOLIC BLOOD PRESSURE: 60 MMHG | HEART RATE: 99 BPM | OXYGEN SATURATION: 100 % | SYSTOLIC BLOOD PRESSURE: 100 MMHG

## 2024-03-23 DIAGNOSIS — H60.503 ACUTE OTITIS EXTERNA OF BOTH EARS, UNSPECIFIED TYPE: ICD-10-CM

## 2024-03-23 DIAGNOSIS — H66.92 LEFT OTITIS MEDIA, UNSPECIFIED OTITIS MEDIA TYPE: Primary | ICD-10-CM

## 2024-03-23 PROCEDURE — G0382 LEV 3 HOSP TYPE B ED VISIT: HCPCS | Performed by: FAMILY MEDICINE

## 2024-03-23 RX ORDER — AMOXICILLIN AND CLAVULANATE POTASSIUM 875; 125 MG/1; MG/1
1 TABLET, FILM COATED ORAL EVERY 12 HOURS SCHEDULED
Qty: 14 TABLET | Refills: 0 | Status: SHIPPED | OUTPATIENT
Start: 2024-03-23 | End: 2024-03-30

## 2024-03-23 RX ORDER — NAPROXEN 250 MG/1
TABLET ORAL
COMMUNITY
Start: 2023-12-22

## 2024-03-23 RX ORDER — CIPROFLOXACIN AND DEXAMETHASONE 3; 1 MG/ML; MG/ML
4 SUSPENSION/ DROPS AURICULAR (OTIC) 2 TIMES DAILY
Qty: 7.5 ML | Refills: 1 | Status: SHIPPED | OUTPATIENT
Start: 2024-03-23

## 2024-03-23 NOTE — PROGRESS NOTES
[facility]        NAME: Sirena Shepherd is a 34 y.o. female  : 1990    MRN: [id number]  DATE: 2024  TIME: 9:48 AM    Assessment and Plan   Left otitis media, unspecified otitis media type [H66.92]  1. Left otitis media, unspecified otitis media type  amoxicillin-clavulanate (AUGMENTIN) 875-125 mg per tablet      2. Acute otitis externa of both ears, unspecified type  ciprofloxacin-dexamethasone (CIPRODEX) otic suspension            Patient Instructions       Follow up with PCP in 3-5 days.  Proceed to  ER if symptoms worsen.    If tests have been performed at [facility], our office will contact you with results if changes need to be made to the care plan discussed with you at the visit.  You can review your full results on [facility] MyChart.    Chief Complaint     Chief Complaint   Patient presents with   • Earache     Patient c/o bilateral ear pain with discharge x 2 days          History of Present Illness   Earache   Associated symptoms include ear discharge. Pertinent negatives include no coughing, diarrhea, headaches, rash, rhinorrhea, sore throat or vomiting.     Sirena Shepherd is a 34 y.o. female  who presented to [facility] today with a 3-4 weeks of itching in her ears.  Then it progressed to drainage from bilateral ears.  Yesterday after leaving work she started to feel more pain in her left ear preauricular, and noticed enlarged lymph nodes.  She has had a slight fever since this morning.  She took some Naproxen last night.  Pt started Ofloxacin drop both ears 3 nights ago.  He hearing is muffled.       Review of Systems   Review of Systems   Constitutional:  Positive for chills and fever.   HENT:  Positive for ear discharge and ear pain. Negative for congestion, rhinorrhea and sore throat.    Respiratory:  Negative for cough and shortness of breath.    Cardiovascular:  Negative for chest pain.   Gastrointestinal:  Negative for diarrhea, nausea and vomiting.    Skin:  Negative for rash.   Neurological:  Negative for dizziness and headaches.         Current Medications       Current Outpatient Medications:   •  amoxicillin-clavulanate (AUGMENTIN) 875-125 mg per tablet, Take 1 tablet by mouth every 12 (twelve) hours for 7 days, Disp: 14 tablet, Rfl: 0  •  ciprofloxacin-dexamethasone (CIPRODEX) otic suspension, Administer 4 drops into both ears 2 (two) times a day, Disp: 7.5 mL, Rfl: 1  •  naproxen (NAPROSYN) 250 mg tablet, , Disp: , Rfl:   •  cholecalciferol (VITAMIN D3) 1,000 units tablet, Take 5,000 Units by mouth daily, Disp: , Rfl:   •  fluticasone (FLONASE) 50 mcg/act nasal spray, 1 spray into each nostril daily, Disp: , Rfl:   •  ibuprofen (MOTRIN) 600 mg tablet, Take 1 tablet (600 mg total) by mouth every 8 (eight) hours as needed for mild pain, Disp: 15 tablet, Rfl: 0  •  multivitamin (THERAGRAN) TABS, Take 1 tablet by mouth daily, Disp: , Rfl:     Current Allergies     Allergies as of 03/23/2024 - Reviewed 12/22/2023   Allergen Reaction Noted   • Cefaclor Hives 07/17/2015   • Cefprozil Hives 07/17/2015   • Cephalosporins Hives 07/17/2015            The following portions of the patient's history were reviewed and updated as appropriate: allergies, current medications, past family history, past medical history, past social history, past surgical history and problem list.     No past medical history on file.    Past Surgical History:   Procedure Laterality Date   • MOLE REMOVAL Right 2013    right dorasal hand - benign   • [place] EXC B9 LESION MRGN XCP SK TG T/A/L >4.0 CM Right 4/14/2023    Procedure: EXCISIONAL BIOPSY RIGHT BREAST SKIN LESION WITH COMPLEX CLOSURE;  Surgeon: Liban Barnett MD;  Location:  MAIN OR;  Service: Plastics   • WISDOM TOOTH EXTRACTION Bilateral        Family History   Problem Relation Age of Onset   • No Known Problems Mother    • Mitral valve prolapse Father    • No Known Problems Sister          Medications have been  verified.        Objective   /60   Pulse 99   Temp (!) 100.6 °F (38.1 °C)   Resp 18   SpO2 100%   No LMP recorded.       Physical Exam     Physical Exam  Vitals and nursing note reviewed.   Constitutional:       Appearance: She is well-developed.   HENT:      Head: Normocephalic and atraumatic.      Right Ear: Tympanic membrane normal.      Left Ear: Drainage, swelling and tenderness present.      Ears:      Comments: Bilateral ear canals swollen, left greater than the right    Left ear: + yellowish fluid present in ear canal, TM slightly visible, no erythema visible  + enlarged pre and post auricular lymph nodes  Cardiovascular:      Rate and Rhythm: Normal rate.   Pulmonary:      Effort: Pulmonary effort is normal. No respiratory distress.   Neurological:      Mental Status: She is alert and oriented to person, place, and time.   Psychiatric:         Mood and Affect: Mood normal.         Behavior: Behavior normal.

## 2024-03-23 NOTE — PATIENT INSTRUCTIONS
Swimmer's Ear   AMBULATORY CARE:   Swimmer's ear , also called otitis externa, is an infection in the outer ear canal. This canal goes from the outside of your ear to your eardrum. Swimmer's ear most often occurs when water remains in your ear after you swim. This creates a moist area for bacteria to grow. Scratches or damage from your fingers, cotton swabs, or other objects can also cause swimmer's ear.       Common signs and symptoms:   Ear pain    Red, swollen, itchy ear canal    Fluid or pus draining from your ear    A plugged ear and trouble hearing    Seek care immediately if:   You have severe ear pain.    You are suddenly not able to hear.    You have new swelling in your face, behind your ears, or in your neck.    You suddenly cannot move part of your face, or it feels numb.    Call your doctor if:   You have a fever.    Your symptoms get worse or do not go away, even after treatment.    You have questions or concerns about your condition or care.    Treatment for swimmer's ear  may include cleaning your outer ear canal first. This will help clean any ear wax, flaky skin, or other discharge. You may then need any of the following:  Medicines:      Ear drops  help fight infection and decrease redness and swelling.    Acetaminophen  decreases pain and fever. It is available without a doctor's order. Ask how much to take and how often to take it. Follow directions. Read the labels of all other medicines you are using to see if they also contain acetaminophen, or ask your doctor or pharmacist. Acetaminophen can cause liver damage if not taken correctly.    NSAIDs , such as ibuprofen, help decrease swelling, pain, and fever. This medicine is available with or without a doctor's order. NSAIDs can cause stomach bleeding or kidney problems in certain people. If you take blood thinner medicine, always ask your healthcare provider if NSAIDs are safe for you. Always read the medicine label and follow directions.    An  ear wick  may be used if your ear canal is blocked. A wick (small tube) made of cotton or gauze is placed in your ear. The wick helps pull extra fluid out of your ear canal. Juanpablo also help draw medicine into your ear canal.    How to use ear drops:   Warm the bottle of ear drops in your hands  for a few minutes.    Lie down on your side with your infected ear facing up.  This will help the medicine travel completely through your ear canal.    Gently pull the ear up and back.  Carefully drip the correct number of ear drops into your ear. Have another person help you if possible.         For children younger than 3 years,  gently pull and hold the ear down and back.         For children older than 3 years,  gently pull and hold the ear up and back.         Stay in the same position  for 3 to 5 minutes to let the medicine soak in.    Prevent swimmer's ear:   Dry your ears completely after you swim or bathe.  Gently wipe your outer ear with a soft towel or cloth. Use ear plugs when you swim.    Do not put cotton swabs or other objects in your ears.  These can scratch or damage your ear. They can also push ear wax deeper in and irritate your ear.    Put cotton balls gently in your outer ear  when you apply hair spray, hair dye, or perfume.    Follow up with your doctor as directed:  Write down your questions so you remember to ask them during your visits.  © Copyright [employer] 2023 Information is for End User's use only and may not be sold, redistributed or otherwise used for commercial purposes.  The above information is an  only. It is not intended as medical advice for individual conditions or treatments. Talk to your doctor, nurse or pharmacist before following any medical regimen to see if it is safe and effective for you.

## 2024-04-16 ENCOUNTER — OFFICE VISIT (OUTPATIENT)
Dept: URGENT CARE | Age: 34
End: 2024-04-16
Payer: COMMERCIAL

## 2024-04-16 VITALS
TEMPERATURE: 98.7 F | OXYGEN SATURATION: 100 % | DIASTOLIC BLOOD PRESSURE: 72 MMHG | HEART RATE: 88 BPM | SYSTOLIC BLOOD PRESSURE: 116 MMHG | RESPIRATION RATE: 18 BRPM

## 2024-04-16 DIAGNOSIS — J02.9 ACUTE PHARYNGITIS, UNSPECIFIED ETIOLOGY: Primary | ICD-10-CM

## 2024-04-16 DIAGNOSIS — J02.9 SORE THROAT: ICD-10-CM

## 2024-04-16 LAB — S PYO AG THROAT QL: NEGATIVE

## 2024-04-16 PROCEDURE — 87880 STREP A ASSAY W/OPTIC: CPT

## 2024-04-16 PROCEDURE — G0382 LEV 3 HOSP TYPE B ED VISIT: HCPCS | Performed by: EMERGENCY MEDICINE

## 2024-04-16 PROCEDURE — 87070 CULTURE OTHR SPECIMN AEROBIC: CPT

## 2024-04-16 NOTE — LETTER
April 16, 2024     Patient: Sirena Shepherd   YOB: 1990   Date of Visit: 4/16/2024       To Whom it May Concern:    Sirena Shepherd was seen in my clinic on 4/16/2024. She may return to work on 4/17/2024 .    If you have any questions or concerns, please don't hesitate to call.         Sincerely,          Carla Phillips PA-C        CC: No Recipients

## 2024-04-16 NOTE — PROGRESS NOTES
[facility]        NAME: Sirena Shepherd is a 34 y.o. female  : 1990    MRN: [id number]  DATE: 2024  TIME: 3:06 PM    Assessment and Plan   Acute pharyngitis, unspecified etiology [J02.9]  1. Acute pharyngitis, unspecified etiology        2. Sore throat  POCT rapid ANTIGEN strepA    Throat culture      Supportive care discussed. Discussed with patient that we do not offer Monospot testing here, and she would need to follow up with her PCP. Recommend ibuprofen, hydration, and rest. Work note provided. Patient understanding and agreeable to plan.   Patient Instructions     Rapid strep completed in office today. Negative rapid strep - will send for culture.   Follow-up with PCP in the next 3-5 days if no improvement.   Go to the ED if symptoms severely worsen.    Chief Complaint     Chief Complaint   Patient presents with    Sore Throat     Sore throat. Sx for one week. Taking allegra.      History of Present Illness     Sirena Shepherd is a 34 y.o. female presenting to the office today for sore throat.   Symptoms have been present for 7 days, and include sore throat. She notes fatigue as well.  Patient concerned for infectious mononucleosis.  She has tried Allegra for her symptoms, minimal relief.  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 Negative for chills.   HENT:  Positive for congestion and sore throat. Negative for ear pain and trouble swallowing.    Respiratory:  Negative for cough, shortness of breath, wheezing and stridor.    Gastrointestinal:  Negative for abdominal pain, nausea and vomiting.   Genitourinary: Negative.    Musculoskeletal: Negative.    Skin: Negative.  Negative for rash.   Neurological: Negative.        Current Medications       Current Outpatient Medications:     multivitamin (THERAGRAN) TABS, Take 1 tablet by mouth daily, Disp: , Rfl:      cholecalciferol (VITAMIN D3) 1,000 units tablet, Take 5,000 Units by mouth daily, Disp: , Rfl:     ciprofloxacin-dexamethasone (CIPRODEX) otic suspension, Administer 4 drops into both ears 2 (two) times a day, Disp: 7.5 mL, Rfl: 1    fluticasone (FLONASE) 50 mcg/act nasal spray, 1 spray into each nostril daily, Disp: , Rfl:     ibuprofen (MOTRIN) 600 mg tablet, Take 1 tablet (600 mg total) by mouth every 8 (eight) hours as needed for mild pain, Disp: 15 tablet, Rfl: 0    naproxen (NAPROSYN) 250 mg tablet, , Disp: , Rfl:     Current Allergies     Allergies as of 04/16/2024 - Reviewed 04/16/2024   Allergen Reaction Noted    Cefaclor Hives 07/17/2015    Cefprozil Hives 07/17/2015    Cephalosporins Hives 07/17/2015            The following portions of the patient's history were reviewed and updated as appropriate: allergies, current medications, past family history, past medical history, past social history, past surgical history and problem list.     No past medical history on file.    Past Surgical History:   Procedure Laterality Date    MOLE REMOVAL Right 2013    right dorasal hand - benign    UT EXC B9 LESION MRGN XCP SK TG T/A/L >4.0 CM Right 4/14/2023    Procedure: EXCISIONAL BIOPSY RIGHT BREAST SKIN LESION WITH COMPLEX CLOSURE;  Surgeon: Liban Barnett MD;  Location: [facility] OR;  Service: Plastics    WISDOM TOOTH EXTRACTION Bilateral        Family History   Problem Relation Age of Onset    No Known Problems Mother     Mitral valve prolapse Father     No Known Problems Sister        Medications have been verified.    Objective     /72   Pulse 88   Temp 98.7 °F (37.1 °C) (Tympanic)   Resp 18   SpO2 100%   No LMP recorded.     Physical Exam     Physical Exam  Vitals and nursing note reviewed.   Constitutional:       General: She is not in acute distress.     Appearance: Normal appearance. She is normal weight. She is not ill-appearing, toxic-appearing or diaphoretic.   HENT:      Head: Normocephalic and  atraumatic.      Right Ear: Tympanic membrane, ear canal and external ear normal. No drainage, swelling or tenderness. No middle ear effusion. There is no impacted cerumen. Tympanic membrane is not erythematous.      Left Ear: Tympanic membrane, ear canal and external ear normal. No drainage, swelling or tenderness.  No middle ear effusion. There is no impacted cerumen. Tympanic membrane is not erythematous.      Nose: Congestion and rhinorrhea present.      Mouth/Throat:      Mouth: Mucous membranes are moist. No oral lesions.      Pharynx: Oropharynx is clear. Uvula midline. Posterior oropharyngeal erythema (mild erythema of posterior pharynx) present. No pharyngeal swelling, oropharyngeal exudate or uvula swelling.      Tonsils: No tonsillar exudate or tonsillar abscesses. 1+ on the right. 1+ on the left.   Eyes:      General:         Right eye: No discharge.         Left eye: No discharge.      Conjunctiva/sclera: Conjunctivae normal.   Cardiovascular:      Rate and Rhythm: Normal rate and regular rhythm.      Pulses: Normal pulses.      Heart sounds: Normal heart sounds. No murmur heard.     No friction rub. No gallop.   Pulmonary:      Effort: Pulmonary effort is normal. No respiratory distress.      Breath sounds: Normal breath sounds. No stridor. No wheezing, rhonchi or rales.   Chest:      Chest wall: No tenderness.   Musculoskeletal:         General: Normal range of motion.      Cervical back: Normal range of motion and neck supple. No rigidity or tenderness.   Lymphadenopathy:      Cervical: No cervical adenopathy.   Skin:     General: Skin is warm and dry.      Capillary Refill: Capillary refill takes less than 2 seconds.      Coloration: Skin is not jaundiced.      Findings: No bruising or rash.   Neurological:      General: No focal deficit present.      Mental Status: She is alert. Mental status is at baseline.   Psychiatric:         Mood and Affect: Mood normal.         Behavior: Behavior normal.                    Answers submitted by the patient for this visit:  Sore Throat Questionnaire (Submitted on 4/16/2024)  Chief Complaint: Sore throat  Chronicity: new  Onset: in the past 7 days  Progression since onset: rapidly worsening  Fever: 100 - 100.9 F  Fever duration: less than 1 day  Pain - numeric: 6/10  hoarse voice: Yes  strep: Yes

## 2024-04-19 LAB — BACTERIA THROAT CULT: NORMAL

## 2024-11-21 ENCOUNTER — TELEPHONE (OUTPATIENT)
Dept: FAMILY MEDICINE CLINIC | Facility: CLINIC | Age: 34
End: 2024-11-21

## 2024-11-21 DIAGNOSIS — E55.9 VITAMIN D DEFICIENCY: ICD-10-CM

## 2024-11-21 DIAGNOSIS — Z00.00 ANNUAL PHYSICAL EXAM: Primary | ICD-10-CM

## 2024-11-21 DIAGNOSIS — Z13.1 SCREENING FOR DIABETES MELLITUS: ICD-10-CM

## 2024-11-21 DIAGNOSIS — Z13.220 SCREENING CHOLESTEROL LEVEL: ICD-10-CM

## (undated) DEVICE — OCCLUSIVE GAUZE STRIP,3% BISMUTH TRIBROMOPHENATE IN PETROLATUM BLEND: Brand: XEROFORM

## (undated) DEVICE — NEEDLE 25G X 1 1/2

## (undated) DEVICE — INTENDED FOR TISSUE SEPARATION, AND OTHER PROCEDURES THAT REQUIRE A SHARP SURGICAL BLADE TO PUNCTURE OR CUT.: Brand: BARD-PARKER ® CARBON RIB-BACK BLADES

## (undated) DEVICE — GLOVE SRG BIOGEL 6.5

## (undated) DEVICE — INTENDED FOR TISSUE SEPARATION, AND OTHER PROCEDURES THAT REQUIRE A SHARP SURGICAL BLADE TO PUNCTURE OR CUT.: Brand: BARD-PARKER SAFETY BLADES SIZE 15, STERILE

## (undated) DEVICE — DRAPE ADOLESCENT LAPAROTOMY

## (undated) DEVICE — SUT VICRYL 3-0 SH 27 IN J416H

## (undated) DEVICE — BETHLEHEM UNIVERSAL OUTPATIENT: Brand: CARDINAL HEALTH

## (undated) DEVICE — SUT NYLON 5-0 P-3 18 IN 690G

## (undated) DEVICE — TIBURON SPLIT SHEET: Brand: CONVERTORS